# Patient Record
Sex: FEMALE | Race: WHITE | NOT HISPANIC OR LATINO | Employment: FULL TIME | URBAN - METROPOLITAN AREA
[De-identification: names, ages, dates, MRNs, and addresses within clinical notes are randomized per-mention and may not be internally consistent; named-entity substitution may affect disease eponyms.]

---

## 2017-01-09 ENCOUNTER — TRANSFERRED RECORDS (OUTPATIENT)
Dept: HEALTH INFORMATION MANAGEMENT | Facility: CLINIC | Age: 42
End: 2017-01-09

## 2017-01-10 ENCOUNTER — OFFICE VISIT (OUTPATIENT)
Dept: FAMILY MEDICINE | Facility: CLINIC | Age: 42
End: 2017-01-10
Payer: COMMERCIAL

## 2017-01-10 VITALS
DIASTOLIC BLOOD PRESSURE: 63 MMHG | SYSTOLIC BLOOD PRESSURE: 111 MMHG | HEART RATE: 60 BPM | TEMPERATURE: 98 F | OXYGEN SATURATION: 99 %

## 2017-01-10 DIAGNOSIS — R07.0 THROAT PAIN: ICD-10-CM

## 2017-01-10 DIAGNOSIS — J02.0 ACUTE STREPTOCOCCAL PHARYNGITIS: Primary | ICD-10-CM

## 2017-01-10 LAB
DEPRECATED S PYO AG THROAT QL EIA: ABNORMAL
MICRO REPORT STATUS: ABNORMAL
SPECIMEN SOURCE: ABNORMAL

## 2017-01-10 PROCEDURE — 99213 OFFICE O/P EST LOW 20 MIN: CPT | Performed by: PHYSICIAN ASSISTANT

## 2017-01-10 PROCEDURE — 87880 STREP A ASSAY W/OPTIC: CPT | Performed by: PHYSICIAN ASSISTANT

## 2017-01-10 RX ORDER — AZITHROMYCIN 250 MG/1
TABLET, FILM COATED ORAL
Qty: 6 TABLET | Refills: 0 | Status: SHIPPED | OUTPATIENT
Start: 2017-01-10 | End: 2017-07-31

## 2017-01-10 NOTE — PATIENT INSTRUCTIONS
When You Have a Sore Throat  A sore throat can be painful. There are many reasons why you may have a sore throat. Your healthcare provider will work with you to find the cause of your sore throat. He or she will also find the best treatment for you.      What Causes a Sore Throat?  Sore throats can be caused or worsened by:    Cold or flu viruses    Bacteria    Irritants such as tobacco smoke    Acid reflux  A Healthy Throat  The tonsils are on the sides of the throat near the base of the tongue. They collect viruses and bacteria and help fight infection. The throat (pharynx) is the passage for air. Mucus from the nasal cavity also moves down the passage.  An Inflamed Throat  The tonsils and pharynx can become inflamed due to a cold or flu virus. Postnasal drip (excess mucus draining from the nasal cavity) can irritate the throat. It can also make the throat or tonsils more likely to be infected by bacteria. Severe, untreated tonsillitis in children or adults can cause a pocket of pus (abscess) to form near the tonsil.  Your Evaluation  A medical evaluation can help find the cause of your sore throat. It can also help your healthcare provider choose the best treatment for you. The evaluation may include a health history, physical exam, and diagnostic tests.  Health History  Your healthcare provider may ask you the following:    How long has the sore throat lasted and how have you been treating it?    Do you have any other symptoms, such as body aches, fever, or cough?    Does your sore throat recur? If so, how often? How many days of school or work have you missed because of a sore throat?    Do you have trouble eating or swallowing?    Have you been told that you snore or have other sleep problems?    Do you have bad breath?    Do you cough up bad-tasting mucus?  Physical Exam  During the exam, your healthcare provider checks your ears, nose, and throat for problems. He or she also checks for swelling in the  neck, and may listen to your chest.  Possible Tests  Other tests your healthcare provider may perform include:    A throat swab to check for bacteria such as streptococcus (the bacteria that causes strep throat)    A blood test to check for mononucleosis (a viral infection)    A chest x-ray to rule out pneumonia, especially if you have a cough  Treating a Sore Throat  Treatment depends on many factors. What is the likely cause? Is the problem recent? Does it keep coming back? In many cases, the best thing to do is to treat the symptoms, rest, and let the problem heal itself. Antibiotics may help clear up some infections. For cases of severe or recurring tonsillitis, the tonsils may need to be removed.  Relieving Your Symptoms    Don t smoke, and avoid secondhand smoke.    For children, try throat sprays or Popsicles. Adults and older children may try lozenges.    Drink warm liquids to soothe the throat and help thin mucus. Avoid alcohol, spicy foods, and acidic drinks such as orange juice. These can irritate the throat.    Gargle with warm saltwater (1 teaspoon of salt to 8 ounces of warm water).    Use a humidifier to keep air moist and relieve throat dryness.    Try over-the-counter pain relievers such as acetaminophen or ibuprofen. Use as directed, and don t exceed the recommended dose. Don t give aspirin to children.   Are Antibiotics Needed?  If your sore throat is due to a bacterial infection, antibiotics may speed healing and prevent complications. But most sore throats are caused by cold or flu viruses. And antibiotics don t treat viral illness. In fact, using antibiotics when they re not needed may produce bacteria that are harder to kill. Your healthcare provider will prescribe antibiotics only if he or she thinks they are likely to help.  If Antibiotics Are Prescribed  Take the medication exactly as directed. Be sure to finish your prescription even if you re feeling better.  And be sure to ask your  healthcare provider or pharmacist what side effects are common and what to do about them.  Is Surgery Needed?  In some cases, tonsils need to be removed. This is often done as outpatient (same-day) surgery. Your healthcare provider may advise removing the tonsils in cases of:    Several severe bouts of tonsillitis in a year.  Severe  episodes include those that lead to missed days of school or work, or that need to be treated with antibiotics.    Tonsillitis that causes breathing problems during sleep.    Tonsillitis caused by food particles collecting in pouches in the tonsils (cryptic tonsillitis).  Call your healthcare provider if any of the following occur:    Symptoms worsen, or new symptoms develop.    Swollen tonsils make breathing difficult.    The pain is severe enough to keep you from drinking liquids.    A skin rash, hives, or wheezing develops. Any of these could signal an allergic reaction to antibiotics.    Symptoms don t improve within a week.    Symptoms don t improve within 2-3 days of starting antibiotics.     8828-9955 [employer]. [address] [place] [address], [place] [address]. All rights reserved. This information is not intended as a substitute for professional medical care. Always follow your healthcare professional's instructions.

## 2017-01-10 NOTE — PROGRESS NOTES
SUBJECTIVE:                                                    Ulises Calderon is a 41 year old female who presents to clinic today for the following health issues:    Acute Illness   Acute illness concerns: Sore throat  Onset:     Fever: no    Chills/Sweats: no    Headache (location?): no    Sinus Pressure:no    Conjunctivitis:  no    Ear Pain: no    Rhinorrhea: no    Congestion: no    Sore Throat: YES     Cough: no    Wheeze: no    Decreased Appetite: no    Nausea: no    Vomiting: no    Diarrhea:  no    Dysuria/Freq.: no    Fatigue/Achiness: YES    Sick/Strep Exposure: unknown, but had it in december     Therapies Tried and outcome: Nothing    Problem list and histories reviewed & adjusted, as indicated.  Additional history: as documented    ROS:  C: NEGATIVE for fever, chills, change in weight  R: NEGATIVE for significant cough or SOB  CV: NEGATIVE for chest pain, palpitations or peripheral edema  GI: NEGATIVE for nausea, abdominal pain, heartburn, or change in bowel habits    Patient Active Problem List   Diagnosis     Herpes simplex virus (HSV) infection     cervical cone bx      HUMAN PAPILLOMAVIRUS NOS     Anxiety state     CARDIOVASCULAR SCREENING; LDL GOAL LESS THAN 160     Endometriosis     RA (rheumatoid arthritis) (H)     Past Surgical History   Procedure Laterality Date     C nonspecific procedure       Left radius fx - s/p surgery     C iud,mirena  10/06     removed right away due to bleeding all the time     Surgical history of -   3/12/09     D&C; ablation of endometriosis     C/section, low transverse  2006      x2     D & c       Conization       abnormal pap     Esophagoscopy, gastroscopy, duodenoscopy (egd), combined  2012     Procedure:COMBINED ESOPHAGOSCOPY, GASTROSCOPY, DUODENOSCOPY (EGD); Surgeon:DEBBIE ANDREWS; Location: GI     Orthopedic surgery       left wrist       Social History   Substance Use Topics     Smoking status: Never Smoker      Smokeless  tobacco: Never Used     Alcohol Use: 1.0 oz/week      Comment: occ     Family History   Problem Relation Age of Onset     C.A.D. No family hx of      DIABETES No family hx of      Hypertension No family hx of      Breast Cancer No family hx of      Cancer - colorectal No family hx of      Respiratory Sister      ASTHMA     CANCER Maternal Grandmother      cancer of the larynx           Problem list, Medication list, Allergies, and Medical/Social/Surgical histories reviewed in [facility] and updated as appropriate.    OBJECTIVE:                                                    /63 mmHg  Pulse 60  Temp(Src) 98  F (36.7  C) (Oral)  SpO2 99% There is no weight on file to calculate BMI.   General appearance: alert/in no distress  Ears: R TM -External ear normal. Canal clear. TM normal, L TM - External ear normal. Canal clear. TM normal  Nose:clear rhinorrea, mild mucosal injection and edema  Oropharynx: With mild erythema. Without edema, or exudates. Moist mucus membranes. No lesions  Neck: normal, supple and small, benign anterior cervical nodes bilaterally  Lungs: clear to auscultation, no rhales, rhonchi or wheezing  Heart: regular rate and rhythm and no murmurs, clicks, or gallops        Results for orders placed or performed in visit on 01/10/17 (from the past 24 hour(s))   Strep, Rapid Screen   Result Value Ref Range    Specimen Description Throat     Rapid Strep A Screen (A)      POSITIVE: Group A Streptococcal antigen detected by immunoassay.    Micro Report Status FINAL 01/10/2017           ASSESSMENT/PLAN:                                                        ICD-10-CM    1. Acute streptococcal pharyngitis J02.0 azithromycin (ZITHROMAX) 250 MG tablet   2. Throat pain R07.0 Strep, Rapid Screen       Patient Instructions       When You Have a Sore Throat  A sore throat can be painful. There are many reasons why you may have a sore throat. Your healthcare provider will work with you to find the cause of your  sore throat. He or she will also find the best treatment for you.      What Causes a Sore Throat?  Sore throats can be caused or worsened by:    Cold or flu viruses    Bacteria    Irritants such as tobacco smoke    Acid reflux  A Healthy Throat  The tonsils are on the sides of the throat near the base of the tongue. They collect viruses and bacteria and help fight infection. The throat (pharynx) is the passage for air. Mucus from the nasal cavity also moves down the passage.  An Inflamed Throat  The tonsils and pharynx can become inflamed due to a cold or flu virus. Postnasal drip (excess mucus draining from the nasal cavity) can irritate the throat. It can also make the throat or tonsils more likely to be infected by bacteria. Severe, untreated tonsillitis in children or adults can cause a pocket of pus (abscess) to form near the tonsil.  Your Evaluation  A medical evaluation can help find the cause of your sore throat. It can also help your healthcare provider choose the best treatment for you. The evaluation may include a health history, physical exam, and diagnostic tests.  Health History  Your healthcare provider may ask you the following:    How long has the sore throat lasted and how have you been treating it?    Do you have any other symptoms, such as body aches, fever, or cough?    Does your sore throat recur? If so, how often? How many days of school or work have you missed because of a sore throat?    Do you have trouble eating or swallowing?    Have you been told that you snore or have other sleep problems?    Do you have bad breath?    Do you cough up bad-tasting mucus?  Physical Exam  During the exam, your healthcare provider checks your ears, nose, and throat for problems. He or she also checks for swelling in the neck, and may listen to your chest.  Possible Tests  Other tests your healthcare provider may perform include:    A throat swab to check for bacteria such as streptococcus (the bacteria that  causes strep throat)    A blood test to check for mononucleosis (a viral infection)    A chest x-ray to rule out pneumonia, especially if you have a cough  Treating a Sore Throat  Treatment depends on many factors. What is the likely cause? Is the problem recent? Does it keep coming back? In many cases, the best thing to do is to treat the symptoms, rest, and let the problem heal itself. Antibiotics may help clear up some infections. For cases of severe or recurring tonsillitis, the tonsils may need to be removed.  Relieving Your Symptoms    Don t smoke, and avoid secondhand smoke.    For children, try throat sprays or Popsicles. Adults and older children may try lozenges.    Drink warm liquids to soothe the throat and help thin mucus. Avoid alcohol, spicy foods, and acidic drinks such as orange juice. These can irritate the throat.    Gargle with warm saltwater (1 teaspoon of salt to 8 ounces of warm water).    Use a humidifier to keep air moist and relieve throat dryness.    Try over-the-counter pain relievers such as acetaminophen or ibuprofen. Use as directed, and don t exceed the recommended dose. Don t give aspirin to children.   Are Antibiotics Needed?  If your sore throat is due to a bacterial infection, antibiotics may speed healing and prevent complications. But most sore throats are caused by cold or flu viruses. And antibiotics don t treat viral illness. In fact, using antibiotics when they re not needed may produce bacteria that are harder to kill. Your healthcare provider will prescribe antibiotics only if he or she thinks they are likely to help.  If Antibiotics Are Prescribed  Take the medication exactly as directed. Be sure to finish your prescription even if you re feeling better.  And be sure to ask your healthcare provider or pharmacist what side effects are common and what to do about them.  Is Surgery Needed?  In some cases, tonsils need to be removed. This is often done as outpatient  "(same-day) surgery. Your healthcare provider may advise removing the tonsils in cases of:    Several severe bouts of tonsillitis in a year.  Severe  episodes include those that lead to missed days of school or work, or that need to be treated with antibiotics.    Tonsillitis that causes breathing problems during sleep.    Tonsillitis caused by food particles collecting in pouches in the tonsils (cryptic tonsillitis).  Call your healthcare provider if any of the following occur:    Symptoms worsen, or new symptoms develop.    Swollen tonsils make breathing difficult.    The pain is severe enough to keep you from drinking liquids.    A skin rash, hives, or wheezing develops. Any of these could signal an allergic reaction to antibiotics.    Symptoms don t improve within a week.    Symptoms don t improve within 2-3 days of starting antibiotics.     1768-4100 [employer]. [address] [place] [address]. All rights reserved. This information is not intended as a substitute for professional medical care. Always follow your healthcare professional's instructions.                Estimated body mass index is 23.25 kg/(m^2) as calculated from the following:    Height as of 10/13/16: 5' 4\" (1.626 m).    Weight as of 12/12/16: 135 lb 8 oz (61.462 kg).       Ling Coyne [facility] [[email or web address]]kim  [facility]        "

## 2017-02-23 ENCOUNTER — MYC MEDICAL ADVICE (OUTPATIENT)
Dept: FAMILY MEDICINE | Facility: CLINIC | Age: 42
End: 2017-02-23

## 2017-02-23 NOTE — TELEPHONE ENCOUNTER
Refer to [employer] message. Closing encounter as no further action is needed.    Shu Dent RN  [facility]

## 2017-04-12 ENCOUNTER — MYC REFILL (OUTPATIENT)
Dept: FAMILY MEDICINE | Facility: CLINIC | Age: 42
End: 2017-04-12

## 2017-04-12 DIAGNOSIS — F41.1 GAD (GENERALIZED ANXIETY DISORDER): ICD-10-CM

## 2017-04-12 NOTE — TELEPHONE ENCOUNTER
Routing refill request to provider for review/approval because:  Drug not on the [facility] refill protocol     Lorazepam refill request  Last refill 12/6/16,  review done    Mohan Taylor RN

## 2017-04-12 NOTE — TELEPHONE ENCOUNTER
Message from MyChart:  Original authorizing provider: MD Ulises Yuan would like a refill of the following medications:  LORazepam (ATIVAN) 1 MG tablet [Sohail Simmons MD]    Preferred pharmacy: [place], [place] - [address]    Comment:

## 2017-04-13 RX ORDER — LORAZEPAM 1 MG/1
1 TABLET ORAL EVERY 8 HOURS PRN
Qty: 30 TABLET | Refills: 0 | Status: SHIPPED | OUTPATIENT
Start: 2017-04-13 | End: 2017-08-16

## 2017-04-25 ENCOUNTER — TRANSFERRED RECORDS (OUTPATIENT)
Dept: HEALTH INFORMATION MANAGEMENT | Facility: CLINIC | Age: 42
End: 2017-04-25

## 2017-05-08 ENCOUNTER — MYC MEDICAL ADVICE (OUTPATIENT)
Dept: FAMILY MEDICINE | Facility: CLINIC | Age: 42
End: 2017-05-08

## 2017-05-08 NOTE — TELEPHONE ENCOUNTER
Called pt and pulled up Trevin's information and documented in his chart.    Darcie Bowman RN  [facility]

## 2017-07-31 ENCOUNTER — OFFICE VISIT (OUTPATIENT)
Dept: FAMILY MEDICINE | Facility: CLINIC | Age: 42
End: 2017-07-31
Payer: COMMERCIAL

## 2017-07-31 ENCOUNTER — TRANSFERRED RECORDS (OUTPATIENT)
Dept: HEALTH INFORMATION MANAGEMENT | Facility: CLINIC | Age: 42
End: 2017-07-31

## 2017-07-31 VITALS
BODY MASS INDEX: 23.6 KG/M2 | OXYGEN SATURATION: 100 % | DIASTOLIC BLOOD PRESSURE: 77 MMHG | HEART RATE: 66 BPM | SYSTOLIC BLOOD PRESSURE: 122 MMHG | WEIGHT: 137.5 LBS | TEMPERATURE: 98.3 F

## 2017-07-31 DIAGNOSIS — G44.201 ACUTE INTRACTABLE TENSION-TYPE HEADACHE: ICD-10-CM

## 2017-07-31 DIAGNOSIS — R20.2 NUMBNESS AND TINGLING OF LEFT SIDE OF FACE: Primary | ICD-10-CM

## 2017-07-31 DIAGNOSIS — R20.0 NUMBNESS AND TINGLING OF LEFT SIDE OF FACE: Primary | ICD-10-CM

## 2017-07-31 PROCEDURE — 99213 OFFICE O/P EST LOW 20 MIN: CPT | Performed by: FAMILY MEDICINE

## 2017-07-31 NOTE — PROGRESS NOTES
SUBJECTIVE:                                                    Ulises Calderon is a 42 year old female who presents to clinic today for the following health issues:    ED/UC Followup:    Facility:  [facility]  Date of visit: 7/19/17  Reason for visit: numbness in face, tongue swelling, vision issues w/ left eye   Current Status: Improved          In considering pregnancy  Did get a 2 day headache after the event  Did come after she stopped the methotrexate  Problem list and histories reviewed & adjusted, as indicated.  Additional history: as documented    Labs reviewed in EPIC    Reviewed and updated as needed this visit by clinical staff       Reviewed and updated as needed this visit by Provider         ROS:  Constitutional, HEENT, cardiovascular, pulmonary, gi and gu systems are negative, except as otherwise noted.      OBJECTIVE:   /77 (BP Location: Left arm, Patient Position: Chair, Cuff Size: Adult Regular)  Pulse 66  Temp 98.3  F (36.8  C) (Oral)  Wt 137 lb 8 oz (62.4 kg)  SpO2 100%  BMI 23.6 kg/m2  Body mass index is 23.6 kg/(m^2).  GENERAL: healthy, alert and no distress  EYES: Eyes grossly normal to inspection, PERRL and conjunctivae and sclerae normal  HENT: ear canals and TM's normal, nose and mouth without ulcers or lesions  NECK: no adenopathy, no asymmetry, masses, or scars and thyroid normal to palpation  RESP: lungs clear to auscultation - no rales, rhonchi or wheezes  CV: regular rate and rhythm, normal S1 S2, no S3 or S4, no murmur, click or rub, no peripheral edema and peripheral pulses strong  ABDOMEN: soft, nontender, no hepatosplenomegaly, no masses and bowel sounds normal  SKIN: no suspicious lesions or rashes  NEURO: Normal strength and tone, mentation intact and speech normal  PSYCH: mentation appears normal, affect normal/bright  LYMPH: no cervical, supraclavicular, axillary, or inguinal adenopathy        ASSESSMENT/PLAN:             1. Numbness and tingling of left side of  face  normal exam now, may have been a migraine   stop OCP  Reviewed BC options  Start folic acid    2. Acute intractable tension-type headache  Resolved   call if recurs      Regular exercise  See Patient Instructions    Sohail Simmons MD  [facility]

## 2017-07-31 NOTE — NURSING NOTE
"Chief Complaint   Patient presents with     RECHECK       Initial /77 (BP Location: Left arm, Patient Position: Chair, Cuff Size: Adult Regular)  Pulse 66  Temp 98.3  F (36.8  C) (Oral)  Wt 137 lb 8 oz (62.4 kg)  SpO2 100%  BMI 23.6 kg/m2 Estimated body mass index is 23.6 kg/(m^2) as calculated from the following:    Height as of 10/13/16: 5' 4\" (1.626 m).    Weight as of this encounter: 137 lb 8 oz (62.4 kg).  Medication Reconciliation: complete     Dannielle Albert CMA    "

## 2017-07-31 NOTE — MR AVS SNAPSHOT
After Visit Summary   7/31/2017    Ulises Calderon    MRN: [id number]           Patient Information     Date Of Birth          1975        Visit Information        Provider Department      7/31/2017 11:30 AM Sohail Simmons MD [facility]        Today's Diagnoses     Numbness and tingling of left side of face    -  1    Acute intractable tension-type headache           Follow-ups after your visit        Who to contact     If you have questions or need follow up information about today's clinic visit or your schedule please contact [facility] directly at [phone].  Normal or non-critical lab and imaging results will be communicated to you by [employer], letter or phone within 4 business days after the clinic has received the results. If you do not hear from us within 7 days, please contact the clinic through [employer] or phone. If you have a critical or abnormal lab result, we will notify you by phone as soon as possible.  Submit refill requests through [employer] or call your pharmacy and they will forward the refill request to us. Please allow 3 business days for your refill to be completed.          Additional Information About Your Visit        MyChart Information     [employer] gives you secure access to your electronic health record. If you see a primary care provider, you can also send messages to your care team and make appointments. If you have questions, please call your primary care clinic.  If you do not have a primary care provider, please call [phone] and they will assist you.        Care EveryWhere ID     This is your Care EveryWhere ID. This could be used by other organizations to access your [place] medical records  [id number]        Your Vitals Were     Pulse Temperature Pulse Oximetry BMI (Body Mass Index)          66 98.3  F (36.8  C) (Oral) 100% 23.6 kg/m2         Blood Pressure from Last 3 Encounters:   07/31/17 122/77   01/10/17 111/63    12/12/16 113/70    Weight from Last 3 Encounters:   07/31/17 137 lb 8 oz (62.4 kg)   12/12/16 135 lb 8 oz (61.5 kg)   10/13/16 129 lb (58.5 kg)              Today, you had the following     No orders found for display         Today's Medication Changes          These changes are accurate as of: 7/31/17 12:58 PM.  If you have any questions, ask your nurse or doctor.               Stop taking these medicines if you haven't already. Please contact your care team if you have questions.     azithromycin 250 MG tablet   Commonly known as:  ZITHROMAX           BENZONATATE PO           ciprofloxacin 250 MG tablet   Commonly known as:  CIPRO                    Primary Care Provider Office Phone # Fax #    Sohail Juan Simmons [phone]       [facility] [address] [facility] [address]  [facility] [address]        Equal Access to Services     TAVIA [facility]HELLE : Hadii samanta grossman hadasho Soyuliya, waaxda luqadaha, qaybta kaalmada adeegyada, laura blackmon haydariela travis . So [facility] [phone].    ATENCIÓN: Si habla español, tiene a almanza disposición servicios gratuitos de asistencia lingüística. Llame al [phone].    We comply with applicable federal civil rights laws and [place] laws. We do not discriminate on the basis of race, color, national origin, age, disability sex, sexual orientation or gender identity.            Thank you!     Thank you for choosing [facility]  for your care. Our goal is always to provide you with excellent care. Hearing back from our patients is one way we can continue to improve our services. Please take a few minutes to complete the written survey that you may receive in the mail after your visit with us. Thank you!             Your Updated Medication List - Protect others around you: Learn how to safely use, store and throw away your medicines at www.disposemymeds.org.          This list is accurate as of: 7/31/17 12:58 PM.  Always use your most  recent med list.                   Brand Name Dispense Instructions for use Diagnosis    FOLIC ACID PO      Take 1 mg by mouth daily        HUMIRA 20 MG/0.4ML Kit   Generic drug:  adalimumab      Inject 20 mg Subcutaneous every 14 days        LORazepam 1 MG tablet    ATIVAN    30 tablet    Take 1 tablet (1 mg) by mouth every 8 hours as needed for anxiety    SARA (generalized anxiety disorder)       METHOTREXATE PO      Take 2.5 mg by mouth        norethindrone-ethinyl estradiol 0.5-35 MG-MCG per tablet    NECON 0.5/35 (28)    84 tablet    Take 1 tablet by mouth daily    Birth control       venlafaxine 37.5 MG 24 hr capsule    EFFEXOR-XR    46 capsule    Take 1 capsule daily for 14 days, then take 2 capsules daily.    SARA (generalized anxiety disorder)

## 2017-08-01 ENCOUNTER — MYC MEDICAL ADVICE (OUTPATIENT)
Dept: FAMILY MEDICINE | Facility: CLINIC | Age: 42
End: 2017-08-01

## 2017-08-01 DIAGNOSIS — Z30.018 ENCOUNTER FOR INITIAL PRESCRIPTION OF OTHER CONTRACEPTIVES: Primary | ICD-10-CM

## 2017-08-01 RX ORDER — ACETAMINOPHEN AND CODEINE PHOSPHATE 120; 12 MG/5ML; MG/5ML
1 SOLUTION ORAL DAILY
Qty: 84 TABLET | Refills: 1 | Status: SHIPPED | OUTPATIENT
Start: 2017-08-01 | End: 2019-11-29

## 2017-08-01 NOTE — TELEPHONE ENCOUNTER
Dr. Simmons,     Patient was seen by rheumatologist yesterday. She would like to stay on an oral contraceptive until September. Pharmacy cued. Please see her [employer] message below.         Thank you,   Shu Dent RN  [facility]

## 2017-08-01 NOTE — TELEPHONE ENCOUNTER
OK to switch to   Orders Placed This Encounter     norethindrone (MICRONOR) 0.35 MG per tablet     Sig: Take 1 tablet (0.35 mg) by mouth daily     Dispense:  84 tablet     Refill:  1

## 2017-10-11 ENCOUNTER — MYC MEDICAL ADVICE (OUTPATIENT)
Dept: FAMILY MEDICINE | Facility: CLINIC | Age: 42
End: 2017-10-11

## 2017-10-11 NOTE — TELEPHONE ENCOUNTER
Spoke with pt. Asked her if she was soaking through more than 2 pads or tampons per hour for more than 2 hours and her response was yes.  States it is to the point where she is using a super tampon for 2-4 hours, then nothing, then happens again and has to wear pads or tampons together. Monday am woke up and soaked through sheets, sweat pants and mattress. No dizziness. Body feels like what it feels like with her regular period. Sunday night she had a lot of pain with intercourse. Not unusual for her to have a heavy period like this every 3 months. Goal is to be off the pill until she conceives.  Last time the heavy bleeding happened was this am. The bleeding started on day 14 of her cycle. When she starts bleeding, it lasts for 5-7 days. Recommended she go to ER and pt declined. Has been that heavy since Monday.    Her questions are:  1. Is this normal for her to bleed weekly?   2. How long before her body regulates and is this part of the process.   3. Can she conceive with all of this bleeding?     Did advise that it is normal to have breakthrough bleeding, but this amount does not seem normal. Will route to Dr. Simmons. Pt is aware that Dr. Simmons is out of office until tomorrow.     Darcie Bowman RN  [facility]

## 2017-10-11 NOTE — TELEPHONE ENCOUNTER
Attempted call to pt. Left message on answering machine for patient to call back.    Need more information about her symptoms.    Sent [employer] message to pt. Await response or call back.    Darcie oBwman RN  [facility]

## 2017-10-12 NOTE — TELEPHONE ENCOUNTER
I called patient  'she is off all med s EXCEPT folate and PNV   her bleeding stopped today   we discussed pre conception issues for her age  Follow up as needed

## 2017-10-12 NOTE — TELEPHONE ENCOUNTER
Dr. Simmons,     Patient sent a [employer] message this afternoon stating bleeding stopped this morning, no major bleeding since 6:00pm last night.     Shu Dent RN  [facility]

## 2017-10-19 RX ORDER — NORETHINDRONE AND ETHINYL ESTRADIOL 0.5-0.035
KIT ORAL
Qty: 84 TABLET | Refills: 3 | OUTPATIENT
Start: 2017-10-19

## 2017-10-19 NOTE — TELEPHONE ENCOUNTER
Spoke with patient, she reports she is not taking this medication (Neocon)    Shu Dent BSN RN  [facility]

## 2018-01-10 ENCOUNTER — MYC REFILL (OUTPATIENT)
Dept: FAMILY MEDICINE | Facility: CLINIC | Age: 43
End: 2018-01-10

## 2018-01-10 DIAGNOSIS — F41.1 GAD (GENERALIZED ANXIETY DISORDER): ICD-10-CM

## 2018-01-10 NOTE — TELEPHONE ENCOUNTER
Message from MyChart:  Original authorizing provider: PRESTON Tipton CNP would like a refill of the following medications:  LORazepam (ATIVAN) 1 MG tablet [PRESTON Tipton CNP]    Preferred pharmacy: [facility] [address]    Comment:

## 2018-01-10 NOTE — TELEPHONE ENCOUNTER
LORazepam (ATIVAN) 1 MG tablet        Last Written Prescription Date:  8/17/17  Last Fill Quantity: 10,   # refills: 0  Last Office Visit: 7/31/17  Future Office visit:       Routing refill request to provider for review/approval because:  Drug not on the [facility], P or [facility] refill protocol or controlled substance

## 2018-01-11 RX ORDER — LORAZEPAM 1 MG/1
1 TABLET ORAL EVERY 8 HOURS PRN
Qty: 10 TABLET | Refills: 0 | Status: SHIPPED | OUTPATIENT
Start: 2018-01-11 | End: 2018-06-25

## 2018-01-11 NOTE — TELEPHONE ENCOUNTER
Problem List Complete:  Yes      /Last Office Visit with [facility] primary care provider:     Clinic visit frequency required: unspecified     Future Office visit:     Controlled substance agreement on file: No.     Processing:  may be called or faxed     checked in past 6 months?  No, route to RN no chronic pain    Do you want E-visit or office visit?    Thank you,  Papito Tobias RN

## 2018-03-15 ENCOUNTER — MYC MEDICAL ADVICE (OUTPATIENT)
Dept: FAMILY MEDICINE | Facility: CLINIC | Age: 43
End: 2018-03-15

## 2018-03-15 NOTE — TELEPHONE ENCOUNTER
[employer] message sent to patient    Shu Dent BSN RN  [facility]

## 2018-03-19 ENCOUNTER — OFFICE VISIT (OUTPATIENT)
Dept: FAMILY MEDICINE | Facility: CLINIC | Age: 43
End: 2018-03-19
Payer: COMMERCIAL

## 2018-03-19 VITALS
HEART RATE: 74 BPM | OXYGEN SATURATION: 100 % | DIASTOLIC BLOOD PRESSURE: 79 MMHG | SYSTOLIC BLOOD PRESSURE: 130 MMHG | TEMPERATURE: 98 F

## 2018-03-19 DIAGNOSIS — N97.9 FEMALE INFERTILITY: Primary | ICD-10-CM

## 2018-03-19 PROCEDURE — 99214 OFFICE O/P EST MOD 30 MIN: CPT | Performed by: FAMILY MEDICINE

## 2018-03-19 NOTE — NURSING NOTE
"Chief Complaint   Patient presents with     Fertility       Initial /79 (BP Location: Left arm, Patient Position: Chair, Cuff Size: Adult Regular)  Pulse 74  Temp 98  F (36.7  C) (Oral)  SpO2 100% Estimated body mass index is 23.6 kg/(m^2) as calculated from the following:    Height as of 10/13/16: 5' 4\" (1.626 m).    Weight as of 7/31/17: 137 lb 8 oz (62.4 kg).  Medication Reconciliation: complete     Dannielle Albert CMA    "

## 2018-03-19 NOTE — PROGRESS NOTES
SUBJECTIVE:  Ulises Calderon, a 43 year old female scheduled an appointment to discuss the following issues:  Female infertility   Off BC, trying for 6 months with new partner   patient got pregnant easly twice before, feels like she is ovualting but cycles longer at times   have not fathered any children    Medical, social, surgical, and family histories reviewed.    ROS:  CONSTITUTIONAL: NEGATIVE for fever, chills  EYES: NEGATIVE for vision changes   RESP: NEGATIVE for significant cough or SOB  CV: NEGATIVE for chest pain, palpitations   GI: NEGATIVE for nausea, abdominal pain, heartburn, or change in bowel habits  : NEGATIVE for frequency, dysuria, or hematuria  MUSCULOSKELETAL: NEGATIVE for significant arthralgias or myalgia  NEURO: NEGATIVE for weakness, dizziness or paresthesias or headache    OBJECTIVE:  /79 (BP Location: Left arm, Patient Position: Chair, Cuff Size: Adult Regular)  Pulse 74  Temp 98  F (36.7  C) (Oral)  SpO2 100%  EXAM:  GENERAL APPEARANCE: healthy, alert and no distress  EYES: EOMI,  PERRL  HENT: ear canals and TM's normal and nose and mouth without ulcers or lesions  RESP: lungs clear to auscultation - no rales, rhonchi or wheezes  CV: regular rates and rhythm, normal S1 S2, no S3 or S4 and no murmur, click or rub -  ABDOMEN:  soft, nontender, no HSM or masses and bowel sounds normal  : normal cervix, adnexae, and uterus without masses or discharge and rectal exam normal without masses-guaiac negative stool    ASSESSMENT/PLAN:  (N97.9) Female infertility  (primary encounter diagnosis)  Comment: check labs 2-5 days after start of cycle   to get semen analysis  Plan: **CBC with platelets FUTURE 2mo, Follicle         stimulating hormone, Estradiol, Lutropin,         Prolactin        Follow up after tests

## 2018-03-19 NOTE — MR AVS SNAPSHOT
After Visit Summary   3/19/2018    Ulises Calderon    MRN: [id number]           Patient Information     Date Of Birth          1975        Visit Information        Provider Department      3/19/2018 3:00 PM Sohail Simmons MD [facility] [place]        Today's Diagnoses     Female infertility    -  1       Follow-ups after your visit        Future tests that were ordered for you today     Open Future Orders        Priority Expected Expires Ordered    **CBC with platelets FUTURE 2mo Routine 5/18/2018 7/17/2018 3/19/2018    Follicle stimulating hormone Routine  3/19/2019 3/19/2018    Estradiol Routine  3/19/2019 3/19/2018    Lutropin Routine  3/19/2019 3/19/2018    Prolactin Routine  3/19/2019 3/19/2018            Who to contact     If you have questions or need follow up information about today's clinic visit or your schedule please contact [facility] directly at [phone].  Normal or non-critical lab and imaging results will be communicated to you by MyChart, letter or phone within 4 business days after the clinic has received the results. If you do not hear from us within 7 days, please contact the clinic through [employer] or phone. If you have a critical or abnormal lab result, we will notify you by phone as soon as possible.  Submit refill requests through [employer] or call your pharmacy and they will forward the refill request to us. Please allow 3 business days for your refill to be completed.          Additional Information About Your Visit        MyChart Information     [employer] gives you secure access to your electronic health record. If you see a primary care provider, you can also send messages to your care team and make appointments. If you have questions, please call your primary care clinic.  If you do not have a primary care provider, please call [phone] and they will assist you.        Care EveryWhere ID     This is your Care EveryWhere ID. This  could be used by other organizations to access your [facility] medical records  [id number]        Your Vitals Were     Pulse Temperature Pulse Oximetry             74 98  F (36.7  C) (Oral) 100%          Blood Pressure from Last 3 Encounters:   03/19/18 130/79   07/31/17 122/77   01/10/17 111/63    Weight from Last 3 Encounters:   07/31/17 137 lb 8 oz (62.4 kg)   12/12/16 135 lb 8 oz (61.5 kg)   10/13/16 129 lb (58.5 kg)                 Today's Medication Changes          These changes are accurate as of 3/19/18  6:24 PM.  If you have any questions, ask your nurse or doctor.               Stop taking these medicines if you haven't already. Please contact your care team if you have questions.     venlafaxine 37.5 MG 24 hr capsule   Commonly known as:  EFFEXOR-XR   Stopped by:  Sohail Simmons MD                    Primary Care Provider Office Phone # Fax #    Sohail Simmons [phone]       [address] [address]        Equal Access to Services     [facility]: Hadii aad ku hadasho Soomaali, waaxda luqadaha, qaybta kaalmada adefroylanyada, laura travis . So [facility] [phone].    ATENCIÓN: Si habla español, tiene a almanza disposición servicios gratuitos de asistencia lingüística. [facility] [phone].    We comply with applicable federal civil rights laws and [place] laws. We do not discriminate on the basis of race, color, national origin, age, disability, sex, sexual orientation, or gender identity.            Thank you!     Thank you for choosing [facility]  for your care. Our goal is always to provide you with excellent care. Hearing back from our patients is one way we can continue to improve our services. Please take a few minutes to complete the written survey that you may receive in the mail after your visit with us. Thank you!             Your Updated Medication List - Protect others around you: Learn how to  safely use, store and throw away your medicines at www.disposemymeds.org.          This list is accurate as of 3/19/18  6:24 PM.  Always use your most recent med list.                   Brand Name Dispense Instructions for use Diagnosis    FOLIC ACID PO      Take 1 mg by mouth daily        HUMIRA 20 MG/0.4ML Kit   Generic drug:  adalimumab      Inject 20 mg Subcutaneous every 14 days        LORazepam 1 MG tablet    ATIVAN    10 tablet    Take 1 tablet (1 mg) by mouth every 8 hours as needed for anxiety    SARA (generalized anxiety disorder)       METHOTREXATE PO      Take 2.5 mg by mouth        norethindrone 0.35 MG per tablet    MICRONOR    84 tablet    Take 1 tablet (0.35 mg) by mouth daily    Encounter for initial prescription of other contraceptives

## 2018-03-22 DIAGNOSIS — N97.9 FEMALE INFERTILITY: ICD-10-CM

## 2018-03-22 LAB
ESTRADIOL SERPL-MCNC: 63 PG/ML
FSH SERPL-ACNC: 6.1 IU/L
LH SERPL-ACNC: 3.2 IU/L
PROLACTIN SERPL-MCNC: 12 UG/L (ref 3–27)

## 2018-03-22 PROCEDURE — 36415 COLL VENOUS BLD VENIPUNCTURE: CPT | Performed by: FAMILY MEDICINE

## 2018-03-22 PROCEDURE — 84146 ASSAY OF PROLACTIN: CPT | Performed by: FAMILY MEDICINE

## 2018-03-22 PROCEDURE — 82670 ASSAY OF TOTAL ESTRADIOL: CPT | Performed by: FAMILY MEDICINE

## 2018-03-22 PROCEDURE — 83001 ASSAY OF GONADOTROPIN (FSH): CPT | Performed by: FAMILY MEDICINE

## 2018-03-22 PROCEDURE — 83002 ASSAY OF GONADOTROPIN (LH): CPT | Performed by: FAMILY MEDICINE

## 2018-06-05 ENCOUNTER — OFFICE VISIT (OUTPATIENT)
Dept: FAMILY MEDICINE | Facility: CLINIC | Age: 43
End: 2018-06-05
Payer: COMMERCIAL

## 2018-06-05 VITALS
DIASTOLIC BLOOD PRESSURE: 84 MMHG | OXYGEN SATURATION: 100 % | SYSTOLIC BLOOD PRESSURE: 128 MMHG | TEMPERATURE: 98.2 F | HEART RATE: 76 BPM

## 2018-06-05 DIAGNOSIS — M06.9 RHEUMATOID ARTHRITIS INVOLVING MULTIPLE SITES, UNSPECIFIED RHEUMATOID FACTOR PRESENCE: ICD-10-CM

## 2018-06-05 DIAGNOSIS — S83.8X2A ACUTE MEDIAL MENISCAL INJURY OF LEFT KNEE, INITIAL ENCOUNTER: Primary | ICD-10-CM

## 2018-06-05 PROCEDURE — 99214 OFFICE O/P EST MOD 30 MIN: CPT | Performed by: FAMILY MEDICINE

## 2018-06-05 NOTE — PROGRESS NOTES
SUBJECTIVE:   Ulises Calderon is a 43 year old female who presents to clinic today for the following health issues:    Joint Pain    Onset: 2 months     Description:   Location: left knee  Character: feel like a frozen muscle in knee with side side motion. Has a huge popping sound.    Intensity: when sitting or resting will have no pain, but side to side movement will have excruciating pain.     Progression of Symptoms: worsening over the last month    Accompanying Signs & Symptoms:  Other symptoms: swelling and with fluids     History:   Previous similar pain: no       Precipitating factors:   Trauma or overuse: No, but very active and might be from setting knee on bed while picking up a basket and twisted knee.     Alleviating factors:  Improved by: rest/inactivity and ice    Therapies Tried and outcome: ibuprofen and saw a chiropractic a few weeks ago but has no relief at all.         Encounter Diagnoses   Name Primary?     Acute medial meniscal injury of left knee, initial encounter Yes     Rheumatoid arthritis involving multiple sites, unspecified rheumatoid factor presence (H) pain worse with humidity   trying to stay off Humira as she tries to get pregnant       Has been trying to get prepnant times 8 months, had normal labs  Problem list and histories reviewed & adjusted, as indicated.  Additional history: as documented    Labs reviewed in EPIC    Reviewed and updated as needed this visit by clinical staff       Reviewed and updated as needed this visit by Provider         ROS:  Constitutional, HEENT, cardiovascular, pulmonary, gi and gu systems are negative, except as otherwise noted.    OBJECTIVE:     /84  Pulse 76  Temp 98.2  F (36.8  C) (Oral)  SpO2 100%  There is no height or weight on file to calculate BMI.  GENERAL: healthy and alert  NECK: no adenopathy, no asymmetry, masses, or scars and thyroid normal to palpation  RESP: lungs clear to auscultation - no rales, rhonchi or wheezes  CV:  regular rate and rhythm, normal S1 S2, no S3 or S4, no murmur, click or rub, no peripheral edema and peripheral pulses strong  ABDOMEN: soft, nontender, no hepatosplenomegaly, no masses and bowel sounds normal  MS: normal muscle tone and   Knee exam: antalgic gait, soft tissue tenderness over medial joint line, effusion, positive pivot-shift, collateral ligaments intact, positive Gloria sign.  X-ray: ordered, but results not yet available.            ASSESSMENT/PLAN:             1. Acute medial meniscal injury of left knee, initial encounter  Ice, rest  - MR Knee Left w Contrast; Future  - ORTHOPEDICS ADULT REFERRAL    2. Rheumatoid arthritis involving multiple sites, unspecified rheumatoid factor presence (H)  Ok to try medrol, Humira is Cat B so is an optin as she tries to get pregnant    3. Will see OB for fertility workup         See orders in EpicCare    See Patient Instructions    Sohail Simmons MD  [facility]

## 2018-06-05 NOTE — MR AVS SNAPSHOT
After Visit Summary   6/5/2018    Uliess Calderon    MRN: [id number]           Patient Information     Date Of Birth          1975        Visit Information        Provider Department      6/5/2018 8:30 AM Sohail Simmons [place] [facility]        Today's Diagnoses     Acute medial meniscal injury of left knee, initial encounter    -  1       Follow-ups after your visit        Additional Services     ORTHOPEDICS ADULT REFERRAL       Your provider has referred you to: [facility] [place] [phone]   [email or web address]    Please be aware that coverage of these services is subject to the terms and limitations of your health insurance plan.  Call member services at your health plan with any benefit or coverage questions.      Please bring the following to your appointment:    >>   Any x-rays, CTs or MRIs which have been performed.  Contact the facility where they were done to arrange for  prior to your scheduled appointment.  Any new CT, MRI or other procedures ordered by your specialist must be performed at a [facility] facility or coordinated by your clinic's referral office.    >>   List of current medications   >>   This referral request   >>   Any documents/labs given to you for this referral                  Future tests that were ordered for you today     Open Future Orders        Priority Expected Expires Ordered    MR Knee Left w Contrast Routine  6/5/2019 6/5/2018            Who to contact     If you have questions or need follow up information about today's clinic visit or your schedule please contact [facility] directly at [phone].  Normal or non-critical lab and imaging results will be communicated to you by MyChart, letter or phone within 4 business days after the clinic has received the results. If you do not hear from us within 7 days, please contact the clinic through MyChart or phone. If you have a  critical or abnormal lab result, we will notify you by phone as soon as possible.  Submit refill requests through [employer] or call your pharmacy and they will forward the refill request to us. Please allow 3 business days for your refill to be completed.          Additional Information About Your Visit        "[employer]hart Information     [employer] gives you secure access to your electronic health record. If you see a primary care provider, you can also send messages to your care team and make appointments. If you have questions, please call your primary care clinic.  If you do not have a primary care provider, please call [phone] and they will assist you.        Care EveryWhere ID     This is your Care EveryWhere ID. This could be used by other organizations to access your [facility] medical records  [id number]        Your Vitals Were     Pulse Temperature Pulse Oximetry             76 98.2  F (36.8  C) (Oral) 100%          Blood Pressure from Last 3 Encounters:   06/05/18 128/84   03/19/18 130/79   07/31/17 122/77    Weight from Last 3 Encounters:   07/31/17 137 lb 8 oz (62.4 kg)   12/12/16 135 lb 8 oz (61.5 kg)   10/13/16 129 lb (58.5 kg)              We Performed the Following     ORTHOPEDICS ADULT REFERRAL        Primary Care Provider Office Phone # Fax #    Sohail Juan Simmons [phone]       [address] [facility] [address]  [facility] [address]        Equal Access to Services     [facility] : Hadii aad ku hadasho Soomaali, waaxda luqadaha, qaybta kaalmada adeegyada, waxay neymar travis . So [facility] [phone].    ATENCIÓN: Si habla español, tiene a almanza disposición servicios gratuitos de asistencia lingüística. Michelle chavez [phone].    We comply with applicable federal civil rights laws and [place] laws. We do not discriminate on the basis of race, color, national origin, age, disability, sex, sexual orientation, or gender identity.            Thank you!     Thank you for  choosing [facility]  for your care. Our goal is always to provide you with excellent care. Hearing back from our patients is one way we can continue to improve our services. Please take a few minutes to complete the written survey that you may receive in the mail after your visit with us. Thank you!             Your Updated Medication List - Protect others around you: Learn how to safely use, store and throw away your medicines at www.disposemymeds.org.          This list is accurate as of 6/5/18  8:48 AM.  Always use your most recent med list.                   Brand Name Dispense Instructions for use Diagnosis    FOLIC ACID PO      Take 1 mg by mouth daily        HUMIRA 20 MG/0.4ML Kit   Generic drug:  adalimumab      Inject 20 mg Subcutaneous every 14 days        LORazepam 1 MG tablet    ATIVAN    10 tablet    Take 1 tablet (1 mg) by mouth every 8 hours as needed for anxiety    SARA (generalized anxiety disorder)       METHOTREXATE PO      Take 2.5 mg by mouth        norethindrone 0.35 MG per tablet    MICRONOR    84 tablet    Take 1 tablet (0.35 mg) by mouth daily    Encounter for initial prescription of other contraceptives

## 2018-06-11 ENCOUNTER — HOSPITAL ENCOUNTER (OUTPATIENT)
Dept: MRI IMAGING | Facility: CLINIC | Age: 43
Discharge: HOME | End: 2018-06-11
Attending: FAMILY MEDICINE | Admitting: FAMILY MEDICINE
Payer: COMMERCIAL

## 2018-06-11 DIAGNOSIS — S83.8X2A ACUTE MEDIAL MENISCAL INJURY OF LEFT KNEE, INITIAL ENCOUNTER: ICD-10-CM

## 2018-06-11 PROCEDURE — 73721 MRI JNT OF LWR EXTRE W/O DYE: CPT | Mod: LT

## 2018-06-25 ENCOUNTER — OFFICE VISIT (OUTPATIENT)
Dept: OBGYN | Facility: CLINIC | Age: 43
End: 2018-06-25
Payer: COMMERCIAL

## 2018-06-25 ENCOUNTER — OFFICE VISIT (OUTPATIENT)
Dept: FAMILY MEDICINE | Facility: CLINIC | Age: 43
End: 2018-06-25
Payer: COMMERCIAL

## 2018-06-25 VITALS
WEIGHT: 140.3 LBS | OXYGEN SATURATION: 99 % | SYSTOLIC BLOOD PRESSURE: 122 MMHG | HEART RATE: 69 BPM | TEMPERATURE: 98.1 F | BODY MASS INDEX: 23.95 KG/M2 | HEIGHT: 64 IN | DIASTOLIC BLOOD PRESSURE: 76 MMHG

## 2018-06-25 VITALS
TEMPERATURE: 98.1 F | DIASTOLIC BLOOD PRESSURE: 76 MMHG | HEART RATE: 69 BPM | WEIGHT: 140 LBS | BODY MASS INDEX: 23.9 KG/M2 | HEIGHT: 64 IN | SYSTOLIC BLOOD PRESSURE: 122 MMHG

## 2018-06-25 DIAGNOSIS — Z01.818 PREOP GENERAL PHYSICAL EXAM: Primary | ICD-10-CM

## 2018-06-25 DIAGNOSIS — Z23 NEED FOR TDAP VACCINATION: ICD-10-CM

## 2018-06-25 DIAGNOSIS — Z31.49 PROCREATION MANAGEMENT INVESTIGATION AND TESTING: Primary | ICD-10-CM

## 2018-06-25 DIAGNOSIS — M06.9 RHEUMATOID ARTHRITIS INVOLVING MULTIPLE SITES, UNSPECIFIED RHEUMATOID FACTOR PRESENCE: ICD-10-CM

## 2018-06-25 DIAGNOSIS — F41.1 GAD (GENERALIZED ANXIETY DISORDER): ICD-10-CM

## 2018-06-25 DIAGNOSIS — S83.207D ACUTE MENISCAL TEAR OF LEFT KNEE, SUBSEQUENT ENCOUNTER: ICD-10-CM

## 2018-06-25 LAB
ERYTHROCYTE [DISTWIDTH] IN BLOOD BY AUTOMATED COUNT: 13.1 % (ref 10–15)
HCT VFR BLD AUTO: 44 % (ref 35–47)
HGB BLD-MCNC: 14.6 G/DL (ref 11.7–15.7)
MCH RBC QN AUTO: 31.3 PG (ref 26.5–33)
MCHC RBC AUTO-ENTMCNC: 33.2 G/DL (ref 31.5–36.5)
MCV RBC AUTO: 94 FL (ref 78–100)
PLATELET # BLD AUTO: 266 10E9/L (ref 150–450)
RBC # BLD AUTO: 4.67 10E12/L (ref 3.8–5.2)
WBC # BLD AUTO: 8.4 10E9/L (ref 4–11)

## 2018-06-25 PROCEDURE — 93000 ELECTROCARDIOGRAM COMPLETE: CPT | Performed by: FAMILY MEDICINE

## 2018-06-25 PROCEDURE — 84443 ASSAY THYROID STIM HORMONE: CPT | Performed by: OBSTETRICS & GYNECOLOGY

## 2018-06-25 PROCEDURE — 85027 COMPLETE CBC AUTOMATED: CPT | Performed by: FAMILY MEDICINE

## 2018-06-25 PROCEDURE — 99214 OFFICE O/P EST MOD 30 MIN: CPT | Mod: 25 | Performed by: FAMILY MEDICINE

## 2018-06-25 PROCEDURE — 99207 ZZC RSCC CODE FOR CODING REVIEW: CPT | Performed by: FAMILY MEDICINE

## 2018-06-25 PROCEDURE — 99000 SPECIMEN HANDLING OFFICE-LAB: CPT | Performed by: OBSTETRICS & GYNECOLOGY

## 2018-06-25 PROCEDURE — 36415 COLL VENOUS BLD VENIPUNCTURE: CPT | Performed by: FAMILY MEDICINE

## 2018-06-25 PROCEDURE — 90715 TDAP VACCINE 7 YRS/> IM: CPT | Performed by: FAMILY MEDICINE

## 2018-06-25 PROCEDURE — 83520 IMMUNOASSAY QUANT NOS NONAB: CPT | Mod: 90 | Performed by: OBSTETRICS & GYNECOLOGY

## 2018-06-25 PROCEDURE — 99242 OFF/OP CONSLTJ NEW/EST SF 20: CPT | Performed by: OBSTETRICS & GYNECOLOGY

## 2018-06-25 PROCEDURE — 90471 IMMUNIZATION ADMIN: CPT | Performed by: FAMILY MEDICINE

## 2018-06-25 RX ORDER — LORAZEPAM 1 MG/1
1 TABLET ORAL EVERY 8 HOURS PRN
Qty: 10 TABLET | Refills: 0 | Status: SHIPPED | OUTPATIENT
Start: 2018-06-25 | End: 2019-02-05

## 2018-06-25 NOTE — MR AVS SNAPSHOT
After Visit Summary   6/25/2018    Ulises Butler    MRN: [id number]           Patient Information     Date Of Birth          1975        Visit Information        Provider Department      6/25/2018 3:00 PM Sohail Simmons MD [facility]        Today's Diagnoses     Preop general physical exam    -  1    Acute meniscal tear of left knee, subsequent encounter        Need for Tdap vaccination        SARA (generalized anxiety disorder)        Rheumatoid arthritis involving multiple sites, unspecified rheumatoid factor presence (H)          Care Instructions      Before Your Surgery      Call your surgeon if there is any change in your health. This includes signs of a cold or flu (such as a sore throat, runny nose, cough, rash or fever).    Do not smoke, drink alcohol or take over the counter medicine (unless your surgeon or primary care doctor tells you to) for the 24 hours before and after surgery.    If you take prescribed drugs: Follow your doctor s orders about which medicines to take and which to stop until after surgery.    Eating and drinking prior to surgery: follow the instructions from your surgeon    Take a shower or bath the night before surgery. Use the soap your surgeon gave you to gently clean your skin. If you do not have soap from your surgeon, use your regular soap. Do not shave or scrub the surgery site.  Wear clean pajamas and have clean sheets on your bed.           Follow-ups after your visit        Who to contact     If you have questions or need follow up information about today's clinic visit or your schedule please contact [facility] directly at [phone].  Normal or non-critical lab and imaging results will be communicated to you by MyChart, letter or phone within 4 business days after the clinic has received the results. If you do not hear from us within 7 days, please contact the clinic through MyChart or phone. If you have a  "critical or abnormal lab result, we will notify you by phone as soon as possible.  Submit refill requests through [employer]-Time or call your pharmacy and they will forward the refill request to us. Please allow 3 business days for your refill to be completed.          Additional Information About Your Visit        [employer] Information     [employer]-Time gives you secure access to your electronic health record. If you see a primary care provider, you can also send messages to your care team and make appointments. If you have questions, please call your primary care clinic.  If you do not have a primary care provider, please call [phone] and they will assist you.        Care EveryWhere ID     This is your Care EveryWhere ID. This could be used by other organizations to access your [place] medical records  [id number]        Your Vitals Were     Pulse Temperature Height Pulse Oximetry BMI (Body Mass Index)       69 98.1  F (36.7  C) (Oral) 5' 4\" (1.626 m) 99% 24.08 kg/m2        Blood Pressure from Last 3 Encounters:   06/25/18 122/76   06/25/18 122/76   06/05/18 128/84    Weight from Last 3 Encounters:   06/25/18 140 lb (63.5 kg)   06/25/18 140 lb 4.8 oz (63.6 kg)   07/31/17 137 lb 8 oz (62.4 kg)              We Performed the Following     CBC with platelets     EKG 12-lead complete w/read - Clinics     TDAP VACCINE (BOOSTRIX)          Where to get your medicines      Some of these will need a paper prescription and others can be bought over the counter.  Ask your nurse if you have questions.     Bring a paper prescription for each of these medications     LORazepam 1 MG tablet          Primary Care Provider Office Phone # Fax #    Sohail Juan Simmons [phone]       [address] [facility] [address]  [facility] [address]        Equal Access to Services     ALEXUS VICKERS AH: Angeline Manzano, waaxda luqadaha, qaybta kaalmamary gutiérrez, laura neal. So wa " [phone].    ATENCIÓN: Si ashley rico, tiene a almanza disposición servicios gratuitos de asistencia lingüística. Michelle chavez [phone].    We comply with applicable federal civil rights laws and [place] laws. We do not discriminate on the basis of race, color, national origin, age, disability, sex, sexual orientation, or gender identity.            Thank you!     Thank you for choosing [facility]  for your care. Our goal is always to provide you with excellent care. Hearing back from our patients is one way we can continue to improve our services. Please take a few minutes to complete the written survey that you may receive in the mail after your visit with us. Thank you!             Your Updated Medication List - Protect others around you: Learn how to safely use, store and throw away your medicines at www.disposemymeds.org.          This list is accurate as of 6/25/18  3:55 PM.  Always use your most recent med list.                   Brand Name Dispense Instructions for use Diagnosis    FOLIC ACID PO      Take 1 mg by mouth daily        HUMIRA 20 MG/0.4ML Kit   Generic drug:  adalimumab      Inject 20 mg Subcutaneous every 14 days        LORazepam 1 MG tablet    ATIVAN    10 tablet    Take 1 tablet (1 mg) by mouth every 8 hours as needed for anxiety    SARA (generalized anxiety disorder)       METHOTREXATE PO      Take 2.5 mg by mouth        norethindrone 0.35 MG per tablet    MICRONOR    84 tablet    Take 1 tablet (0.35 mg) by mouth daily    Encounter for initial prescription of other contraceptives
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
## 2018-06-25 NOTE — PATIENT INSTRUCTIONS
"Hysterosalpingogram - x ray test to confirm inside of uterus is normal and tubes are open    Instructions for Clomid  1. Have a period  - Take a pregnancy test  - Your first of bleeding (real bleeding, not just spotting) is \"Day One\"    2. Ovulate  - Take clomid on days 3-7 (day 1 is first day of bleeding, not spotting)    3. [place]  - Start day 11  - EVERY OTHER DAY to ensure a high concentration of sperm    4. Check for ovulation  - Ovulation predictor kits helps us know when in cycle you ovulate  - Come in to the lab for a blood draw on day 21 (progesterone). If elevated, this confirms ovulation.  - Let me know when day 21 will be, so we can plan the lab draw if it falls on a weekend    5. Check for pregnancy  - If you ovulated, you'll either get a period, or you'll be pregnant.  - If you don't get a period, take a pregnancy test two weeks after your progesterone level (day 35)    Send me a Comeks message or call with any questions.    Alternative medication: letrozole         Here is the information for the fertility clinics we discussed:     [facility]   [email or web address]     [place] location:  [facility]  [address], [address]  [place] [id number] [phone]     [place] location:  [facility]  [address], [address]  [place], [place] [address] [phone]  __________________________________________________    [facility] [place]  [address]  [phone]  __________________________________________________    [facility]  [email or web address]    [place] Location:  [address], [address]  [place] [address]     [place] Location:  [address], [address]  [place] [address]  [phone]           "
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
## 2018-06-26 LAB — TSH SERPL DL<=0.005 MIU/L-ACNC: 1.55 MU/L (ref 0.4–4)

## 2018-06-28 LAB — MIS SERPL-MCNC: 1.17 NG/ML

## 2018-07-19 ENCOUNTER — TRANSFERRED RECORDS (OUTPATIENT)
Dept: HEALTH INFORMATION MANAGEMENT | Facility: CLINIC | Age: 43
End: 2018-07-19

## 2018-08-04 ENCOUNTER — HEALTH MAINTENANCE LETTER (OUTPATIENT)
Age: 43
End: 2018-08-04

## 2018-08-06 DIAGNOSIS — Z31.49 INVESTIGATION AND TESTING FOR PROCREATION MANAGEMENT: ICD-10-CM

## 2018-08-06 DIAGNOSIS — Z01.812 PRE-PROCEDURE LAB EXAM: Primary | ICD-10-CM

## 2018-08-13 ENCOUNTER — HOSPITAL ENCOUNTER (OUTPATIENT)
Dept: GENERAL RADIOLOGY | Facility: CLINIC | Age: 43
Discharge: HOME | End: 2018-08-13
Attending: OBSTETRICS & GYNECOLOGY | Admitting: OBSTETRICS & GYNECOLOGY
Payer: COMMERCIAL

## 2018-08-13 DIAGNOSIS — Z01.812 PRE-PROCEDURE LAB EXAM: ICD-10-CM

## 2018-08-13 DIAGNOSIS — Z31.49 PROCREATION MANAGEMENT INVESTIGATION AND TESTING: ICD-10-CM

## 2018-08-13 DIAGNOSIS — Z31.49 INVESTIGATION AND TESTING FOR PROCREATION MANAGEMENT: ICD-10-CM

## 2018-08-13 LAB — BETA HCG QUAL IFA URINE: NEGATIVE

## 2018-08-13 PROCEDURE — 25500064 ZZH RX 255 OP 636: Performed by: OBSTETRICS & GYNECOLOGY

## 2018-08-13 PROCEDURE — 58340 CATHETER FOR HYSTEROGRAPHY: CPT | Performed by: OBSTETRICS & GYNECOLOGY

## 2018-08-13 PROCEDURE — 84703 CHORIONIC GONADOTROPIN ASSAY: CPT | Performed by: OBSTETRICS & GYNECOLOGY

## 2018-08-13 PROCEDURE — 74740 X-RAY FEMALE GENITAL TRACT: CPT

## 2018-08-13 RX ORDER — IOPAMIDOL 510 MG/ML
50 INJECTION, SOLUTION INTRAVASCULAR ONCE
Status: COMPLETED | OUTPATIENT
Start: 2018-08-13 | End: 2018-08-13

## 2018-08-13 RX ADMIN — IOPAMIDOL 10 ML: 510 INJECTION, SOLUTION INTRAVASCULAR at 10:54

## 2018-08-14 NOTE — PROCEDURES
The patient is seen today for a hysterosalpingogram to discern tubal patency.     Procedure:  After verbal informed consent was obtained, they patient was placed in dorsal lithotomy on the fluoroscopy table. A speculum was placed in the vagina and the vagina and cervix were prepped with chlorhexidine. A tenaculum was placed on the anterior lip of the cervix.    A TeraFirrma acorn cannula was placed into the cervical os and the speculum was removed.    Under fluoroscopy, the Isovue dye was injected.    The radiologic findings will be documented by the interpreting radiologist.    The cannula and tenaculum were removed.    The patient tolerated the procedure well and was discharged to home.

## 2018-09-07 ENCOUNTER — OFFICE VISIT (OUTPATIENT)
Dept: OBGYN | Facility: CLINIC | Age: 43
End: 2018-09-07
Payer: COMMERCIAL

## 2018-09-07 VITALS — TEMPERATURE: 98.4 F | SYSTOLIC BLOOD PRESSURE: 105 MMHG | DIASTOLIC BLOOD PRESSURE: 69 MMHG | HEART RATE: 78 BPM

## 2018-09-07 DIAGNOSIS — Z31.9 ENCOUNTER FOR PROCREATIVE MANAGEMENT: Primary | ICD-10-CM

## 2018-09-07 PROCEDURE — 99213 OFFICE O/P EST LOW 20 MIN: CPT | Performed by: OBSTETRICS & GYNECOLOGY

## 2018-09-07 RX ORDER — CLOMIPHENE CITRATE 50 MG/1
TABLET ORAL
Qty: 5 TABLET | Refills: 0 | Status: SHIPPED | OUTPATIENT
Start: 2018-09-07 | End: 2018-09-25

## 2018-09-07 NOTE — PATIENT INSTRUCTIONS
"Zafar Montgomery,   Here is the information for the fertility clinics we discussed:     [facility]   [email or web address]     [place] location:  [facility]  [address], [address]  [place] [address]  [phone]     [place] location:  [facility]  [address], [address]  [place] [id number] [phone]  __________________________________________________    [facility] - [place]  [address] [place]  [phone]  __________________________________________________    [facility]  [email or web address].[employer]    [place] Location:  [address], [address]  [place] [address]     [place] Location:  [address], [address]  [place] [address]  [phone]       Instructions for Clomid    1. Have a period  - Your first of bleeding (real bleeding, not just spotting) is \"Day One\"    2. Ovulate  - Take clomid on days 5-9 (day 1 is first day of bleeding, not spotting)    3. Ingram  - Start day 11  - EVERY OTHER DAY to ensure a high concentration of sperm    4. Check for ovulation  - Ovulation predictor kits helps us know when in cycle you ovulate  - Come in to the lab for a blood draw on day 21 (progesterone). If elevated, this confirms ovulation.  - Let me know when day 21 will be, so we can plan the lab draw if it falls on a weekend    5. Check for pregnancy  - If you ovulated, you'll either get a period, or you'll be pregnant.  - If you don't get a period, take a pregnancy test two weeks after your progesterone level (day 35)    Send me a infoBizz message or call with any questions.    "

## 2018-09-07 NOTE — MR AVS SNAPSHOT
"              After Visit Summary   9/7/2018    Ulises Butler    MRN: [id number]           Patient Information     Date Of Birth          1975        Visit Information        Provider Department      9/7/2018 8:15 AM Jackie Christiansen MD [facility]        Today's Diagnoses     Encounter for procreative management    -  1      Care Instructions    Zafar Montgomery,   Here is the information for the fertility clinics we discussed:     [facility]   [email or web address]     [place] location:  [facility]  [address], [address]  [place] [address]  [phone]     [place] location:  [facility]  [address]  [place], [place] [id number] [phone]  __________________________________________________    [facility] [place] [address] [place]  [phone]  __________________________________________________    [facility]  Www.[employer]    [place] Location:  [address], [address]  [place] [address]     [place] Location:  [address] [place] [address], [address]  [place], [place] [address] [phone]       Instructions for Clomid    1. Have a period  - Your first of bleeding (real bleeding, not just spotting) is \"Day One\"    2. Ovulate  - Take clomid on days 5-9 (day 1 is first day of bleeding, not spotting)    3. [place]  - Start day 11  - EVERY OTHER DAY to ensure a high concentration of sperm    4. Check for ovulation  - Ovulation predictor kits helps us know when in cycle you ovulate  - Come in to the lab for a blood draw on day 21 (progesterone). If elevated, this confirms ovulation.  - Let me know when day 21 will be, so we can plan the lab draw if it falls on a weekend    5. Check for pregnancy  - If you ovulated, you'll either get a period, or you'll be pregnant.  - If you don't get a period, take a " pregnancy test two weeks after your progesterone level (day 35)    Send me a [employer] message or call with any questions.            Follow-ups after your visit        Future tests that were ordered for you today     Open Future Orders        Priority Expected Expires Ordered    Progesterone Routine  9/7/2019 9/7/2018            Who to contact     If you have questions or need follow up information about today's clinic visit or your schedule please contact [facility] directly at [phone].  Normal or non-critical lab and imaging results will be communicated to you by Exajoulehart, letter or phone within 4 business days after the clinic has received the results. If you do not hear from us within 7 days, please contact the clinic through [employer] or phone. If you have a critical or abnormal lab result, we will notify you by phone as soon as possible.  Submit refill requests through [employer] or call your pharmacy and they will forward the refill request to us. Please allow 3 business days for your refill to be completed.          Additional Information About Your Visit        [employer] Information     [employer] gives you secure access to your electronic health record. If you see a primary care provider, you can also send messages to your care team and make appointments. If you have questions, please call your primary care clinic.  If you do not have a primary care provider, please call [phone] and they will assist you.        Care EveryWhere ID     This is your Care EveryWhere ID. This could be used by other organizations to access your [facility] medical records  [id number]        Your Vitals Were     Pulse Temperature                78 98.4  F (36.9  C) (Oral)           Blood Pressure from Last 3 Encounters:   09/07/18 105/69   06/25/18 122/76   06/25/18 122/76    Weight from Last 3 Encounters:   06/25/18 140 lb (63.5 kg)   06/25/18 140 lb 4.8 oz (63.6 kg)   07/31/17 137 lb 8 oz (62.4 kg)                  Today's Medication Changes          These changes are accurate as of 9/7/18  8:18 AM.  If you have any questions, ask your nurse or doctor.               Start taking these medicines.        Dose/Directions    clomiPHENE 50 MG tablet   Commonly known as:  CLOMID   Used for:  Encounter for procreative management   Started by:  Jackie Christiansen MD        Days 3-7 of cycle   Quantity:  5 tablet   Refills:  0            Where to get your medicines      These medications were sent to [facility] [place], [place] - [address]  [address], [facility] [address]     Phone:  [phone]     clomiPHENE 50 MG tablet                Primary Care Provider Office Phone # Fax #    Sohail Juan Simmons [phone]       [address]  [facility] [address]        Equal Access to Services     [facility] Hospital[facility]HELLE : Hadii aad ku hadasho Soomaali, waaxda luqadaha, qaybta kaalmada adeegyada, laura wigginsin hayaameredith travis . So [facility] [phone].    ATENCIÓN: Si habla español, tiene a almanza disposición servicios gratuitos de asistencia lingüística. Llame al [phone].    We comply with applicable federal civil rights laws and [place] laws. We do not discriminate on the basis of race, color, national origin, age, disability, sex, sexual orientation, or gender identity.            Thank you!     Thank you for choosing [facility]  for your care. Our goal is always to provide you with excellent care. Hearing back from our patients is one way we can continue to improve our services. Please take a few minutes to complete the written survey that you may receive in the mail after your visit with us. Thank you!             Your Updated Medication List - Protect others around you: Learn how to safely use, store and throw away your medicines at www.disposemymeds.org.          This list is accurate as of 9/7/18  8:18 AM.  Always use your most recent med  list.                   Brand Name Dispense Instructions for use Diagnosis    clomiPHENE 50 MG tablet    CLOMID    5 tablet    Days 3-7 of cycle    Encounter for procreative management       FOLIC ACID PO      Take 1 mg by mouth daily        HUMIRA 20 MG/0.4ML Kit   Generic drug:  adalimumab      Inject 20 mg Subcutaneous every 14 days        LORazepam 1 MG tablet    ATIVAN    10 tablet    Take 1 tablet (1 mg) by mouth every 8 hours as needed for anxiety    SARA (generalized anxiety disorder)       METHOTREXATE PO      Take 2.5 mg by mouth        norethindrone 0.35 MG per tablet    MICRONOR    84 tablet    Take 1 tablet (0.35 mg) by mouth daily    Encounter for initial prescription of other contraceptives

## 2018-09-07 NOTE — NURSING NOTE
"Chief Complaint   Patient presents with     Fertility     follow up       Initial /69  Pulse 78  Temp 98.4  F (36.9  C) (Oral) Estimated body mass index is 24.03 kg/(m^2) as calculated from the following:    Height as of 18: 5' 4\" (1.626 m).    Weight as of 18: 140 lb (63.5 kg).  BP completed using cuff size: regular        The following HM Due: NONE      The following patient reported/Care Every where data was sent to:  P ABSTRACT QUALITY INITIATIVES [09480]  na      n/a              "

## 2018-09-07 NOTE — PROGRESS NOTES
GYN CLINIC VISIT  18  CC: fertility follow up    HPI:  Ulises Butler is a 43 year old  who desires pregnancy.   She was initially seen for evaluation of fertility by me on 17. She and her  have been trying to conceive for 1 year (since 2017) without success.     She has a history of rheumatoid arthritis, previously controlled on methotrexate and humira, not currently on medication and symptoms are well controlled. She also has a history of biopsy proven endometriosis, diagnosed in .     Hormone evaluation includes the following:  TSH 1.55 (18)  AMH 1.17 (18)  FSH 6.1 (3/22/18)  Estradiol 63 (3/22/18)  PRL 12 (3/22/18)    Hysterosalpingogram on 18 was normal, demonstrating patent fallopian tubes bilaterally    Vitals:    18 0807   BP: 105/69   Pulse: 78   Temp: 98.4  F (36.9  C)   TempSrc: Oral     Gen: alert, oriented, no distress      ASSESSMENT:  43 year old  who desires pregnancy. New partner, no pregnancy after 1 year of trying.  Normal testing  H/o endometriosis and rheumatoid arthritis      PLAN:  1. Encounter for procreative management  - clomiPHENE (CLOMID) 50 MG tablet; Days 3-7 of cycle  Dispense: 5 tablet; Refill: 0  - Progesterone; Future    Recommend patient see CHET due to her age. She wants to try clomid first. She is day 5 of her cycle today. Will rx clomid, 50mg to take days 5-9.    Clomid use was discussed including how to take it days 5-9 of this cycle. Discussed risks including specifically possibility of twin pregnancy, ovarian hyperstimulation, bloating, and hot flashes.  The patient's questions were answered. Discussed option of a follicle ultrasound check for about day 12 of cycle.  Patient was also instructed on how to check ovulation predictor kits during this time and how to time coital coverage for maximal pregnancy chances. Plan to check progesterone on day 21 to confirm ovulation. If no pregnancy after 3 cycles of clomid,  strongly recommend seeing CHET at that time.     Over 50% of this 15 min appt was spent in discussion about ovulation induction.     Jackie Christiansen MD

## 2018-09-25 ENCOUNTER — TRANSFERRED RECORDS (OUTPATIENT)
Dept: HEALTH INFORMATION MANAGEMENT | Facility: CLINIC | Age: 43
End: 2018-09-25

## 2018-11-13 ENCOUNTER — TRANSFERRED RECORDS (OUTPATIENT)
Dept: HEALTH INFORMATION MANAGEMENT | Facility: CLINIC | Age: 43
End: 2018-11-13

## 2019-01-31 ENCOUNTER — MYC REFILL (OUTPATIENT)
Dept: FAMILY MEDICINE | Facility: CLINIC | Age: 44
End: 2019-01-31

## 2019-01-31 DIAGNOSIS — F41.1 GAD (GENERALIZED ANXIETY DISORDER): ICD-10-CM

## 2019-01-31 RX ORDER — LORAZEPAM 1 MG/1
1 TABLET ORAL EVERY 8 HOURS PRN
Qty: 10 TABLET | Refills: 0 | Status: CANCELLED | OUTPATIENT
Start: 2019-01-31

## 2019-01-31 NOTE — TELEPHONE ENCOUNTER
Dr. Simmons,  Please review/sign or advise for refill request of:     LORazepam (ATIVAN) 1 MG tablet     LOV: 06/25/2018    :   - Last dispensed: 06/25/2018 #10/4 day supply prescribed by Dr. Simmons    Thank You!  Keke Chowdhury, RN  Triage Nurse

## 2019-02-05 DIAGNOSIS — F41.1 GAD (GENERALIZED ANXIETY DISORDER): ICD-10-CM

## 2019-02-05 RX ORDER — LORAZEPAM 1 MG/1
1 TABLET ORAL EVERY 8 HOURS PRN
Qty: 10 TABLET | Refills: 0 | Status: SHIPPED | OUTPATIENT
Start: 2019-02-05 | End: 2019-06-10

## 2019-02-05 NOTE — TELEPHONE ENCOUNTER
Prescription for   LORazepam (ATIVAN) 1 MG tablet  Faxed to [facility] at [phone]

## 2019-04-25 DIAGNOSIS — M05.79 SEROPOSITIVE RHEUMATOID ARTHRITIS OF MULTIPLE SITES (H): Primary | ICD-10-CM

## 2019-04-25 DIAGNOSIS — Z79.899 DRUG THERAPY: ICD-10-CM

## 2019-04-25 LAB
ALBUMIN SERPL-MCNC: 3.9 G/DL (ref 3.4–5)
ALT SERPL W P-5'-P-CCNC: 20 U/L (ref 0–50)
AST SERPL W P-5'-P-CCNC: 18 U/L (ref 0–45)
BASOPHILS # BLD AUTO: 0 10E9/L (ref 0–0.2)
BASOPHILS NFR BLD AUTO: 0.6 %
CREAT SERPL-MCNC: 0.78 MG/DL (ref 0.52–1.04)
CRP SERPL-MCNC: <2.9 MG/L (ref 0–8)
DIFFERENTIAL METHOD BLD: NORMAL
EOSINOPHIL # BLD AUTO: 0.1 10E9/L (ref 0–0.7)
EOSINOPHIL NFR BLD AUTO: 1.4 %
ERYTHROCYTE [DISTWIDTH] IN BLOOD BY AUTOMATED COUNT: 12.7 % (ref 10–15)
ERYTHROCYTE [SEDIMENTATION RATE] IN BLOOD BY WESTERGREN METHOD: 6 MM/H (ref 0–20)
GFR SERPL CREATININE-BSD FRML MDRD: >90 ML/MIN/{1.73_M2}
HCT VFR BLD AUTO: 41.7 % (ref 35–47)
HGB BLD-MCNC: 13.7 G/DL (ref 11.7–15.7)
LYMPHOCYTES # BLD AUTO: 2 10E9/L (ref 0.8–5.3)
LYMPHOCYTES NFR BLD AUTO: 39.9 %
MCH RBC QN AUTO: 31 PG (ref 26.5–33)
MCHC RBC AUTO-ENTMCNC: 32.9 G/DL (ref 31.5–36.5)
MCV RBC AUTO: 94 FL (ref 78–100)
MONOCYTES # BLD AUTO: 0.4 10E9/L (ref 0–1.3)
MONOCYTES NFR BLD AUTO: 8.4 %
NEUTROPHILS # BLD AUTO: 2.5 10E9/L (ref 1.6–8.3)
NEUTROPHILS NFR BLD AUTO: 49.7 %
PLATELET # BLD AUTO: 245 10E9/L (ref 150–450)
RBC # BLD AUTO: 4.42 10E12/L (ref 3.8–5.2)
WBC # BLD AUTO: 5 10E9/L (ref 4–11)

## 2019-04-25 PROCEDURE — 86140 C-REACTIVE PROTEIN: CPT | Performed by: NURSE PRACTITIONER

## 2019-04-25 PROCEDURE — 82040 ASSAY OF SERUM ALBUMIN: CPT | Performed by: NURSE PRACTITIONER

## 2019-04-25 PROCEDURE — 84450 TRANSFERASE (AST) (SGOT): CPT | Performed by: NURSE PRACTITIONER

## 2019-04-25 PROCEDURE — 36415 COLL VENOUS BLD VENIPUNCTURE: CPT | Performed by: NURSE PRACTITIONER

## 2019-04-25 PROCEDURE — 82565 ASSAY OF CREATININE: CPT | Performed by: NURSE PRACTITIONER

## 2019-04-25 PROCEDURE — 85652 RBC SED RATE AUTOMATED: CPT | Performed by: NURSE PRACTITIONER

## 2019-04-25 PROCEDURE — 84460 ALANINE AMINO (ALT) (SGPT): CPT | Performed by: NURSE PRACTITIONER

## 2019-04-25 PROCEDURE — 85025 COMPLETE CBC W/AUTO DIFF WBC: CPT | Performed by: NURSE PRACTITIONER

## 2019-05-14 ENCOUNTER — TRANSFERRED RECORDS (OUTPATIENT)
Dept: HEALTH INFORMATION MANAGEMENT | Facility: CLINIC | Age: 44
End: 2019-05-14

## 2019-05-17 ENCOUNTER — TRANSFERRED RECORDS (OUTPATIENT)
Dept: HEALTH INFORMATION MANAGEMENT | Facility: CLINIC | Age: 44
End: 2019-05-17

## 2019-06-08 ENCOUNTER — MYC MEDICAL ADVICE (OUTPATIENT)
Dept: FAMILY MEDICINE | Facility: CLINIC | Age: 44
End: 2019-06-08

## 2019-06-08 ENCOUNTER — MYC REFILL (OUTPATIENT)
Dept: FAMILY MEDICINE | Facility: CLINIC | Age: 44
End: 2019-06-08

## 2019-06-08 DIAGNOSIS — F41.1 GAD (GENERALIZED ANXIETY DISORDER): ICD-10-CM

## 2019-06-08 RX ORDER — LORAZEPAM 1 MG/1
1 TABLET ORAL EVERY 8 HOURS PRN
Qty: 10 TABLET | Refills: 0 | Status: CANCELLED | OUTPATIENT
Start: 2019-06-08

## 2019-06-10 RX ORDER — LORAZEPAM 1 MG/1
1 TABLET ORAL EVERY 8 HOURS PRN
Qty: 10 TABLET | Refills: 0 | Status: SHIPPED | OUTPATIENT
Start: 2019-06-10 | End: 2021-02-18

## 2019-06-10 NOTE — TELEPHONE ENCOUNTER
Dr. Simmons;  Please see patient's MyChart message    :  - Last dispensed: 02/06/2019 #10/4 day supply prescribed by Dr. Simmons    Thank You!  Keke Chowdhury, ELINOR  Triage Nurse

## 2019-06-10 NOTE — TELEPHONE ENCOUNTER
Prescription for   LORazepam (ATIVAN) 1 MG tablet  Faxed to [facility] pharmacy at [phone]

## 2019-06-10 NOTE — TELEPHONE ENCOUNTER
Prescription for   LORazepam (ATIVAN) 1 MG tablet  Faxed to [facility] pharmacy at [phone]

## 2019-08-15 ENCOUNTER — TRANSFERRED RECORDS (OUTPATIENT)
Dept: HEALTH INFORMATION MANAGEMENT | Facility: CLINIC | Age: 44
End: 2019-08-15

## 2019-08-25 ENCOUNTER — MYC MEDICAL ADVICE (OUTPATIENT)
Dept: FAMILY MEDICINE | Facility: CLINIC | Age: 44
End: 2019-08-25

## 2019-08-26 NOTE — TELEPHONE ENCOUNTER
Placed request on pt sons chart  No further action needed on this encounter    Sailaja Taylor RN   [facility]

## 2019-10-03 ENCOUNTER — HEALTH MAINTENANCE LETTER (OUTPATIENT)
Age: 44
End: 2019-10-03

## 2019-11-05 ENCOUNTER — MYC MEDICAL ADVICE (OUTPATIENT)
Dept: FAMILY MEDICINE | Facility: CLINIC | Age: 44
End: 2019-11-05

## 2019-11-05 DIAGNOSIS — F41.1 GAD (GENERALIZED ANXIETY DISORDER): Primary | ICD-10-CM

## 2019-11-05 DIAGNOSIS — F40.243 FEAR OF FLYING: ICD-10-CM

## 2019-11-06 RX ORDER — CLONAZEPAM 0.5 MG/1
0.5 TABLET ORAL DAILY PRN
Qty: 10 TABLET | Refills: 0 | Status: SHIPPED | OUTPATIENT
Start: 2019-11-06 | End: 2022-07-14

## 2019-11-06 NOTE — TELEPHONE ENCOUNTER
Dr. Simmons    Please see patient's my chart message.     Thanks  Domitila Adam RN   [facility]

## 2019-11-06 NOTE — TELEPHONE ENCOUNTER
I called patient   OK to try   Orders Placed This Encounter     clonazePAM (KLONOPIN) 0.5 MG tablet     Sig: Take 1 tablet (0.5 mg) by mouth daily as needed for anxiety     Dispense:  10 tablet     Refill:  0     But needs to see me in there nexth month

## 2019-11-14 ENCOUNTER — TRANSFERRED RECORDS (OUTPATIENT)
Dept: HEALTH INFORMATION MANAGEMENT | Facility: CLINIC | Age: 44
End: 2019-11-14

## 2019-11-25 ENCOUNTER — MYC MEDICAL ADVICE (OUTPATIENT)
Dept: FAMILY MEDICINE | Facility: CLINIC | Age: 44
End: 2019-11-25

## 2019-11-25 DIAGNOSIS — Z13.6 CARDIOVASCULAR SCREENING; LDL GOAL LESS THAN 160: Primary | ICD-10-CM

## 2019-11-25 DIAGNOSIS — M06.9 RHEUMATOID ARTHRITIS INVOLVING MULTIPLE SITES, UNSPECIFIED RHEUMATOID FACTOR PRESENCE: ICD-10-CM

## 2019-11-25 NOTE — TELEPHONE ENCOUNTER
Dr. Simmons,    Please see patients my chart message. Please send back if you are ok signing labs so we can my chart message patient back.    Lab cued.    Thanks,  Domitila Adam RN   [facility]

## 2019-11-29 ENCOUNTER — OFFICE VISIT (OUTPATIENT)
Dept: FAMILY MEDICINE | Facility: CLINIC | Age: 44
End: 2019-11-29
Payer: COMMERCIAL

## 2019-11-29 VITALS
DIASTOLIC BLOOD PRESSURE: 80 MMHG | OXYGEN SATURATION: 99 % | BODY MASS INDEX: 23.73 KG/M2 | HEIGHT: 64 IN | TEMPERATURE: 98.4 F | HEART RATE: 65 BPM | SYSTOLIC BLOOD PRESSURE: 114 MMHG | WEIGHT: 139 LBS | RESPIRATION RATE: 16 BRPM

## 2019-11-29 DIAGNOSIS — Z00.00 ROUTINE GENERAL MEDICAL EXAMINATION AT A HEALTH CARE FACILITY: Primary | ICD-10-CM

## 2019-11-29 DIAGNOSIS — F41.8 SITUATIONAL ANXIETY: ICD-10-CM

## 2019-11-29 DIAGNOSIS — M06.9 RHEUMATOID ARTHRITIS INVOLVING MULTIPLE SITES, UNSPECIFIED RHEUMATOID FACTOR PRESENCE: ICD-10-CM

## 2019-11-29 DIAGNOSIS — Z13.6 CARDIOVASCULAR SCREENING; LDL GOAL LESS THAN 160: ICD-10-CM

## 2019-11-29 LAB
ALBUMIN SERPL-MCNC: 3.7 G/DL (ref 3.4–5)
ALP SERPL-CCNC: 52 U/L (ref 40–150)
ALT SERPL W P-5'-P-CCNC: 21 U/L (ref 0–50)
ANION GAP SERPL CALCULATED.3IONS-SCNC: 6 MMOL/L (ref 3–14)
AST SERPL W P-5'-P-CCNC: 13 U/L (ref 0–45)
BILIRUB SERPL-MCNC: 0.6 MG/DL (ref 0.2–1.3)
BUN SERPL-MCNC: 12 MG/DL (ref 7–30)
CALCIUM SERPL-MCNC: 8.7 MG/DL (ref 8.5–10.1)
CHLORIDE SERPL-SCNC: 105 MMOL/L (ref 94–109)
CHOLEST SERPL-MCNC: 185 MG/DL
CO2 SERPL-SCNC: 26 MMOL/L (ref 20–32)
CREAT SERPL-MCNC: 0.65 MG/DL (ref 0.52–1.04)
ERYTHROCYTE [DISTWIDTH] IN BLOOD BY AUTOMATED COUNT: 12.6 % (ref 10–15)
GFR SERPL CREATININE-BSD FRML MDRD: >90 ML/MIN/{1.73_M2}
GLUCOSE SERPL-MCNC: 88 MG/DL (ref 70–99)
HCT VFR BLD AUTO: 41.5 % (ref 35–47)
HDLC SERPL-MCNC: 77 MG/DL
HGB BLD-MCNC: 13.7 G/DL (ref 11.7–15.7)
LDLC SERPL CALC-MCNC: 96 MG/DL
MCH RBC QN AUTO: 31.1 PG (ref 26.5–33)
MCHC RBC AUTO-ENTMCNC: 33 G/DL (ref 31.5–36.5)
MCV RBC AUTO: 94 FL (ref 78–100)
NONHDLC SERPL-MCNC: 108 MG/DL
PLATELET # BLD AUTO: 273 10E9/L (ref 150–450)
POTASSIUM SERPL-SCNC: 3.9 MMOL/L (ref 3.4–5.3)
PROT SERPL-MCNC: 7 G/DL (ref 6.8–8.8)
RBC # BLD AUTO: 4.41 10E12/L (ref 3.8–5.2)
SODIUM SERPL-SCNC: 137 MMOL/L (ref 133–144)
TRIGL SERPL-MCNC: 61 MG/DL
TSH SERPL DL<=0.005 MIU/L-ACNC: 1.2 MU/L (ref 0.4–4)
WBC # BLD AUTO: 9.2 10E9/L (ref 4–11)

## 2019-11-29 PROCEDURE — 36415 COLL VENOUS BLD VENIPUNCTURE: CPT | Performed by: FAMILY MEDICINE

## 2019-11-29 PROCEDURE — 85027 COMPLETE CBC AUTOMATED: CPT | Performed by: FAMILY MEDICINE

## 2019-11-29 PROCEDURE — 99213 OFFICE O/P EST LOW 20 MIN: CPT | Mod: 25 | Performed by: FAMILY MEDICINE

## 2019-11-29 PROCEDURE — 80061 LIPID PANEL: CPT | Performed by: FAMILY MEDICINE

## 2019-11-29 PROCEDURE — 80053 COMPREHEN METABOLIC PANEL: CPT | Performed by: FAMILY MEDICINE

## 2019-11-29 PROCEDURE — 99396 PREV VISIT EST AGE 40-64: CPT | Mod: 25 | Performed by: FAMILY MEDICINE

## 2019-11-29 PROCEDURE — 84443 ASSAY THYROID STIM HORMONE: CPT | Performed by: FAMILY MEDICINE

## 2019-11-29 PROCEDURE — 90686 IIV4 VACC NO PRSV 0.5 ML IM: CPT | Performed by: FAMILY MEDICINE

## 2019-11-29 PROCEDURE — 90471 IMMUNIZATION ADMIN: CPT | Performed by: FAMILY MEDICINE

## 2019-11-29 RX ORDER — METHYLPREDNISOLONE 4 MG/1
20 TABLET ORAL DAILY
COMMUNITY
Start: 2019-11-14 | End: 2022-10-14

## 2019-11-29 ASSESSMENT — MIFFLIN-ST. JEOR: SCORE: 1270.5

## 2019-11-29 NOTE — LETTER
November 29, 2019      Ulises Butler  [address] [place] [address]        Dear ,    We are writing to inform you of your test results.    Your test results fall within the expected range(s) or remain unchanged from previous results.  Please continue with current treatment plan.    Resulted Orders   **TSH with free T4 reflex FUTURE anytime   Result Value Ref Range    TSH 1.20 0.40 - 4.00 mU/L   **Comprehensive metabolic panel FUTURE anytime   Result Value Ref Range    Sodium 137 133 - 144 mmol/L    Potassium 3.9 3.4 - 5.3 mmol/L    Chloride 105 94 - 109 mmol/L    Carbon Dioxide 26 20 - 32 mmol/L    Anion Gap 6 3 - 14 mmol/L    Glucose 88 70 - 99 mg/dL      Comment:      Fasting specimen    Urea Nitrogen 12 7 - 30 mg/dL    Creatinine 0.65 0.52 - 1.04 mg/dL    GFR Estimate >90 >60 mL/min/[1.73_m2]      Comment:      Non  GFR Calc  Starting 12/18/2018, serum creatinine based estimated GFR (eGFR) will be   calculated using the Chronic Kidney Disease Epidemiology Collaboration   (CKD-EPI) equation.      GFR Estimate If Black >90 >60 mL/min/[1.73_m2]      Comment:       GFR Calc  Starting 12/18/2018, serum creatinine based estimated GFR (eGFR) will be   calculated using the Chronic Kidney Disease Epidemiology Collaboration   (CKD-EPI) equation.      Calcium 8.7 8.5 - 10.1 mg/dL    Bilirubin Total 0.6 0.2 - 1.3 mg/dL    Albumin 3.7 3.4 - 5.0 g/dL    Protein Total 7.0 6.8 - 8.8 g/dL    Alkaline Phosphatase 52 40 - 150 U/L    ALT 21 0 - 50 U/L    AST 13 0 - 45 U/L   Lipid panel reflex to direct LDL Fasting   Result Value Ref Range    Cholesterol 185 <200 mg/dL    Triglycerides 61 <150 mg/dL      Comment:      Fasting specimen    HDL Cholesterol 77 >49 mg/dL    LDL Cholesterol Calculated 96 <100 mg/dL      Comment:      Desirable:       <100 mg/dl    Non HDL Cholesterol 108 <130 mg/dL   CBC with platelets   Result Value Ref Range    WBC 9.2 4.0 - 11.0 10e9/L    RBC Count  4.41 3.8 - 5.2 10e12/L    Hemoglobin 13.7 11.7 - 15.7 g/dL    Hematocrit 41.5 35.0 - 47.0 %    MCV 94 78 - 100 fl    MCH 31.1 26.5 - 33.0 pg    MCHC 33.0 31.5 - 36.5 g/dL    RDW 12.6 10.0 - 15.0 %    Platelet Count 273 150 - 450 10e9/L       If you have any questions or concerns, please call the clinic at the number listed above.       Sincerely,        Sohail Simmons MD
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
## 2019-12-16 ENCOUNTER — HEALTH MAINTENANCE LETTER (OUTPATIENT)
Age: 44
End: 2019-12-16

## 2020-01-01 ENCOUNTER — MYC MEDICAL ADVICE (OUTPATIENT)
Dept: FAMILY MEDICINE | Facility: CLINIC | Age: 45
End: 2020-01-01

## 2020-02-14 ENCOUNTER — MYC MEDICAL ADVICE (OUTPATIENT)
Dept: FAMILY MEDICINE | Facility: CLINIC | Age: 45
End: 2020-02-14

## 2020-02-14 NOTE — TELEPHONE ENCOUNTER
Dr. Simmons,    Please see patients my chart message. When searching the name of the medication Ceftnaxonc, it comes up as Ceftriaxone. Please advise if patient ok to take this.    Thanks  Domitila Adam RN   [facility]

## 2020-02-20 ENCOUNTER — TRANSFERRED RECORDS (OUTPATIENT)
Dept: HEALTH INFORMATION MANAGEMENT | Facility: CLINIC | Age: 45
End: 2020-02-20

## 2020-02-21 ENCOUNTER — ALLIED HEALTH/NURSE VISIT (OUTPATIENT)
Dept: NURSING | Facility: CLINIC | Age: 45
End: 2020-02-21
Payer: COMMERCIAL

## 2020-02-21 DIAGNOSIS — Z48.02 ENCOUNTER FOR REMOVAL OF SUTURES: Primary | ICD-10-CM

## 2020-02-21 PROCEDURE — 99207 ZZC NO CHARGE NURSE ONLY: CPT

## 2020-02-21 NOTE — PROGRESS NOTES
Ulises Butler presents to the clinic for removal of sutures and sutures,staples, steri strips. The patient has had sutures in place for 8 days. There has been no patient reported signs or symptoms of infection or drainage. 1  sutures and sutures,staples, staple, steri strips are seen and located on the chin. Tetanus status is up to date. All sutures and sutures,staples, steri strips were easily removed today. Routine wound care discussed by the RN or provider. The patient will follow up as needed.    Domitila Adam RN   [facility]

## 2020-03-09 ENCOUNTER — OFFICE VISIT (OUTPATIENT)
Dept: FAMILY MEDICINE | Facility: CLINIC | Age: 45
End: 2020-03-09
Payer: COMMERCIAL

## 2020-03-09 VITALS
HEART RATE: 63 BPM | BODY MASS INDEX: 23.86 KG/M2 | OXYGEN SATURATION: 100 % | WEIGHT: 139 LBS | TEMPERATURE: 97.2 F | SYSTOLIC BLOOD PRESSURE: 110 MMHG | DIASTOLIC BLOOD PRESSURE: 72 MMHG

## 2020-03-09 DIAGNOSIS — Z00.00 ANNUAL VISIT FOR GENERAL ADULT MEDICAL EXAMINATION WITHOUT ABNORMAL FINDINGS: Primary | ICD-10-CM

## 2020-03-09 DIAGNOSIS — F41.8 SITUATIONAL ANXIETY: ICD-10-CM

## 2020-03-09 DIAGNOSIS — Z12.4 SCREENING FOR MALIGNANT NEOPLASM OF CERVIX: ICD-10-CM

## 2020-03-09 DIAGNOSIS — M06.9 RHEUMATOID ARTHRITIS INVOLVING MULTIPLE SITES, UNSPECIFIED RHEUMATOID FACTOR PRESENCE: ICD-10-CM

## 2020-03-09 PROCEDURE — 99396 PREV VISIT EST AGE 40-64: CPT | Performed by: FAMILY MEDICINE

## 2020-03-09 PROCEDURE — G0145 SCR C/V CYTO,THINLAYER,RESCR: HCPCS | Performed by: FAMILY MEDICINE

## 2020-03-09 PROCEDURE — 87624 HPV HI-RISK TYP POOLED RSLT: CPT | Performed by: FAMILY MEDICINE

## 2020-03-09 NOTE — PROGRESS NOTES
Subjective     Ulises Butler is a 45 year old female who presents to clinic today for the following health issues:    HPI   Patient is here today for annaul exam/ Pap smear.     Encounter Diagnoses   Name Primary?     Annual visit for general adult medical examination without abnormal findings Yes     Screening for malignant neoplasm of cervix      Rheumatoid arthritis involving multiple sites, unspecified rheumatoid factor presence (H), Well controlled on medication       Situational anxiety, doing well         Current Outpatient Medications   Medication Sig Dispense Refill     tocilizumab (ACTEMRA) 162 MG/0.9ML subcutaneous injection Inject 162 mg Subcutaneous Once weekly       adalimumab (HUMIRA) 20 MG/0.4ML KIT Inject 20 mg Subcutaneous every 14 days       clomiPHENE (CLOMID) 50 MG tablet Days 3-7 of cycle (Patient not taking: Reported on 11/29/2019) 5 tablet 3     clonazePAM (KLONOPIN) 0.5 MG tablet Take 1 tablet (0.5 mg) by mouth daily as needed for anxiety (Patient not taking: Reported on 11/29/2019) 10 tablet 0     FOLIC ACID PO Take 1 mg by mouth daily       LORazepam (ATIVAN) 1 MG tablet Take 1 tablet (1 mg) by mouth every 8 hours as needed for anxiety (Patient not taking: Reported on 11/29/2019) 10 tablet 0     Methotrexate Sodium (METHOTREXATE PO) Take 2.5 mg by mouth       methylPREDNISolone (MEDROL) 4 MG tablet Take 20 mg by mouth daily           Reviewed and updated as needed this visit by Provider         Review of Systems   ROS COMP: Constitutional, HEENT, cardiovascular, pulmonary, gi and gu systems are negative, except as otherwise noted.      Objective    /72   Pulse 63   Temp 97.2  F (36.2  C) (Oral)   Wt 63 kg (139 lb)   LMP 02/12/2020 (Exact Date)   SpO2 100%   Breastfeeding No   BMI 23.86 kg/m    Body mass index is 23.86 kg/m .  Physical Exam   GENERAL: healthy, alert and no distress  EYES: Eyes grossly normal to inspection, PERRL and conjunctivae and sclerae normal  HENT:  ear canals and TM's normal, nose and mouth without ulcers or lesions  NECK: no adenopathy, no asymmetry, masses, or scars and thyroid normal to palpation  RESP: lungs clear to auscultation - no rales, rhonchi or wheezes  BREAST: normal without masses, tenderness or nipple discharge, no palpable axillary masses or adenopathy and implant noted  CV: regular rate and rhythm, normal S1 S2, no S3 or S4, no murmur, click or rub, no peripheral edema and peripheral pulses strong  ABDOMEN: soft, nontender, no hepatosplenomegaly, no masses and bowel sounds normal   (female): normal female external genitalia, normal urethral meatus, vaginal mucosa, normal cervix/adnexa/uterus without masses or discharge  SKIN: no suspicious lesions or rashes  NEURO: Normal strength and tone, mentation intact and speech normal  PSYCH: mentation appears normal, affect normal/bright  LYMPH: no cervical, supraclavicular, axillary, or inguinal adenopathy    Diagnostic Test Results:  Labs reviewed in Epic        Assessment & Plan     1. Annual visit for general adult medical examination without abnormal findings  Doing well    2. Screening for malignant neoplasm of cervix  sent  - Pap imaged thin layer screen with HPV - recommended age 30 - 65 years (select HPV order below)  - HPV High Risk Types DNA Cervical    3. Rheumatoid arthritis involving multiple sites, unspecified rheumatoid factor presence (H)  Well controlled   Follow up with consultant as planned.     4. Situational anxiety  reviewd medications          Regular exercise  See Patient Instructions    No follow-ups on file.    Sohail Simmons MD  [facility] [place]

## 2020-03-12 LAB
COPATH REPORT: NORMAL
PAP: NORMAL

## 2020-03-16 ENCOUNTER — MYC MEDICAL ADVICE (OUTPATIENT)
Dept: FAMILY MEDICINE | Facility: CLINIC | Age: 45
End: 2020-03-16

## 2020-03-16 LAB
FINAL DIAGNOSIS: NORMAL
HPV HR 12 DNA CVX QL NAA+PROBE: NEGATIVE
HPV16 DNA SPEC QL NAA+PROBE: NEGATIVE
HPV18 DNA SPEC QL NAA+PROBE: NEGATIVE
SPECIMEN DESCRIPTION: NORMAL
SPECIMEN SOURCE CVX/VAG CYTO: NORMAL

## 2020-03-17 NOTE — TELEPHONE ENCOUNTER
Dr. Simmons,    Please see patients my chart message and message below about calling into clinic.    Thanks  Domitila Adam RN   [facility]

## 2020-03-17 NOTE — TELEPHONE ENCOUNTER
Pt is wanting to speak to someone regarding their [employer] message below. Please call patient at [phone] [email or web address]

## 2020-04-20 ENCOUNTER — TRANSFERRED RECORDS (OUTPATIENT)
Dept: HEALTH INFORMATION MANAGEMENT | Facility: CLINIC | Age: 45
End: 2020-04-20

## 2020-05-09 ENCOUNTER — OFFICE VISIT (OUTPATIENT)
Dept: URGENT CARE | Facility: URGENT CARE | Age: 45
End: 2020-05-09
Payer: COMMERCIAL

## 2020-05-09 VITALS
HEIGHT: 64 IN | DIASTOLIC BLOOD PRESSURE: 54 MMHG | BODY MASS INDEX: 23.9 KG/M2 | TEMPERATURE: 98 F | SYSTOLIC BLOOD PRESSURE: 126 MMHG | OXYGEN SATURATION: 98 % | WEIGHT: 140 LBS | HEART RATE: 72 BPM

## 2020-05-09 DIAGNOSIS — J01.90 ACUTE NON-RECURRENT SINUSITIS, UNSPECIFIED LOCATION: Primary | ICD-10-CM

## 2020-05-09 PROCEDURE — 99214 OFFICE O/P EST MOD 30 MIN: CPT

## 2020-05-09 RX ORDER — FEXOFENADINE HCL 180 MG/1
180 TABLET ORAL DAILY
COMMUNITY
End: 2022-08-14

## 2020-05-09 RX ORDER — DOXYCYCLINE 100 MG/1
100 TABLET ORAL 2 TIMES DAILY
Qty: 14 TABLET | Refills: 0 | Status: SHIPPED | OUTPATIENT
Start: 2020-05-09 | End: 2020-05-16

## 2020-05-09 ASSESSMENT — MIFFLIN-ST. JEOR: SCORE: 1270.04

## 2020-05-09 NOTE — PATIENT INSTRUCTIONS
Sinusitis, possible tmj  Will treat with doxycycline for 7 days  Also advise saline rinse, flonase  Follow up if not improving
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
## 2020-06-28 ENCOUNTER — MYC MEDICAL ADVICE (OUTPATIENT)
Dept: FAMILY MEDICINE | Facility: CLINIC | Age: 45
End: 2020-06-28

## 2020-06-29 ENCOUNTER — TELEPHONE (OUTPATIENT)
Dept: FAMILY MEDICINE | Facility: CLINIC | Age: 45
End: 2020-06-29

## 2020-06-29 DIAGNOSIS — N39.0 URINARY TRACT INFECTION: Primary | ICD-10-CM

## 2020-06-29 DIAGNOSIS — R30.0 DYSURIA: ICD-10-CM

## 2020-06-29 NOTE — TELEPHONE ENCOUNTER
Dr. Simmons,     See mychart msg     Can we start with a video visit? Or do you think Pt should be seen in UC or in person here?     Michael Villalba RN   [facility]

## 2020-06-29 NOTE — TELEPHONE ENCOUNTER
Spoke with patient and scheduled lab appt for lowell Adam RN   [facility] [place]

## 2020-06-29 NOTE — TELEPHONE ENCOUNTER
Dr. Simmons,    Please see message below. / cued. Please advise.    Thanks  Domitila Adam RN   [facility]

## 2020-06-29 NOTE — TELEPHONE ENCOUNTER
Reason for call:  Other   Patient called regarding (reason for call): appointment  Additional comments: pt is having symptoms of a UTI and would like to come in for a UA. Next available in clinic appt was 7/1, which patient did not want to wait for. She is hoping to just stop into the lab to do the test    Phone number to reach patient:  Home number on file [phone] (home)    Best Time:  n/a    Can we leave a detailed message on this number?  YES    Travel screening: Not Applicable

## 2020-06-30 ENCOUNTER — TELEPHONE (OUTPATIENT)
Dept: FAMILY MEDICINE | Facility: CLINIC | Age: 45
End: 2020-06-30

## 2020-06-30 DIAGNOSIS — N30.01 ACUTE CYSTITIS WITH HEMATURIA: Primary | ICD-10-CM

## 2020-06-30 DIAGNOSIS — R30.0 DYSURIA: ICD-10-CM

## 2020-06-30 LAB
ALBUMIN UR-MCNC: NEGATIVE MG/DL
APPEARANCE UR: CLEAR
BACTERIA #/AREA URNS HPF: ABNORMAL /HPF
BILIRUB UR QL STRIP: NEGATIVE
COLOR UR AUTO: YELLOW
GLUCOSE UR STRIP-MCNC: NEGATIVE MG/DL
HGB UR QL STRIP: ABNORMAL
KETONES UR STRIP-MCNC: NEGATIVE MG/DL
LEUKOCYTE ESTERASE UR QL STRIP: NEGATIVE
NITRATE UR QL: NEGATIVE
NON-SQ EPI CELLS #/AREA URNS LPF: ABNORMAL /LPF
PH UR STRIP: 6 PH (ref 5–7)
RBC #/AREA URNS AUTO: ABNORMAL /HPF
SOURCE: ABNORMAL
SP GR UR STRIP: <=1.005 (ref 1–1.03)
UROBILINOGEN UR STRIP-ACNC: 0.2 EU/DL (ref 0.2–1)
WBC #/AREA URNS AUTO: ABNORMAL /HPF

## 2020-06-30 PROCEDURE — 87086 URINE CULTURE/COLONY COUNT: CPT | Performed by: FAMILY MEDICINE

## 2020-06-30 PROCEDURE — 81001 URINALYSIS AUTO W/SCOPE: CPT | Performed by: FAMILY MEDICINE

## 2020-06-30 RX ORDER — NITROFURANTOIN 25; 75 MG/1; MG/1
100 CAPSULE ORAL 2 TIMES DAILY
Qty: 10 CAPSULE | Refills: 0 | Status: SHIPPED | OUTPATIENT
Start: 2020-06-30 | End: 2020-07-05

## 2020-06-30 NOTE — TELEPHONE ENCOUNTER
I called patient she has minimal symptoms    if worse use   Orders Placed This Encounter     nitroFURantoin macrocrystal-monohydrate (MACROBID) 100 MG capsule     Sig: Take 1 capsule (100 mg) by mouth 2 times daily for 5 days     Dispense:  10 capsule     Refill:  0      pending the [facility] results

## 2020-07-01 LAB
BACTERIA SPEC CULT: NO GROWTH
SPECIMEN SOURCE: NORMAL

## 2020-08-27 ENCOUNTER — TRANSFERRED RECORDS (OUTPATIENT)
Dept: HEALTH INFORMATION MANAGEMENT | Facility: CLINIC | Age: 45
End: 2020-08-27

## 2020-10-28 ENCOUNTER — NURSE TRIAGE (OUTPATIENT)
Dept: FAMILY MEDICINE | Facility: CLINIC | Age: 45
End: 2020-10-28

## 2020-10-28 ENCOUNTER — OFFICE VISIT (OUTPATIENT)
Dept: FAMILY MEDICINE | Facility: CLINIC | Age: 45
End: 2020-10-28
Payer: COMMERCIAL

## 2020-10-28 VITALS
WEIGHT: 134.25 LBS | DIASTOLIC BLOOD PRESSURE: 84 MMHG | SYSTOLIC BLOOD PRESSURE: 114 MMHG | TEMPERATURE: 98.5 F | HEART RATE: 59 BPM | BODY MASS INDEX: 22.92 KG/M2 | HEIGHT: 64 IN | RESPIRATION RATE: 18 BRPM | OXYGEN SATURATION: 98 %

## 2020-10-28 DIAGNOSIS — N92.1 METRORRHAGIA: Primary | ICD-10-CM

## 2020-10-28 PROCEDURE — 99214 OFFICE O/P EST MOD 30 MIN: CPT | Performed by: FAMILY MEDICINE

## 2020-10-28 ASSESSMENT — MIFFLIN-ST. JEOR: SCORE: 1244.2

## 2020-10-28 NOTE — PROGRESS NOTES
"Subjective     Ulises Butler is a 45 year old female who presents to clinic today for the following health issues:    HPI         Menstrual Concern  Onset/Duration: started at 9:00am this morning and has been bleeding ever since. She said this morning her sweat pants were completely soaked with a clear like discharge   Description:   Duration of bleeding episodes: 1 days  Frequency of periods: (1st day of one to 1st day of next):  every 28 days  Describe bleeding/flow:   Clots: YES  Number of pads/day: 1- completely soaked         Cramping: moderate  Accompanying Signs & Symptoms:  Lightheadedness: no  Temperature intolerance: no  Nosebleeds/Easy bruising: no  Vaginal Discharge: YES  Acne: no  Change in body hair: no  History:  Patient's last menstrual period was 10/15/2020 (approximate).     She said that she had an ovarian cyst that ruptured back in 2008 or 2009 and she said at the time she had cramping and bloating in her lower extremity and this time it feels like how it did when she had the cyst rupture  .  Previous normal periods: YES  Contraceptive use: NO  Sexually active: YES  Any bleeding after intercourse: no  Abnormal PAP Smears: no        Review of Systems   Constitutional, HEENT, cardiovascular, pulmonary, gi and gu systems are negative, except as otherwise noted.      Objective    /84   Pulse 59   Temp 98.5  F (36.9  C) (Temporal)   Resp 18   Ht 1.626 m (5' 4.02\")   Wt 60.9 kg (134 lb 4 oz)   LMP 10/15/2020 (Approximate)   SpO2 98%   BMI 23.03 kg/m    Body mass index is 23.03 kg/m .  Physical Exam GENERAL: healthy, alert and no distress  ABDOMEN: soft, nontender, no hepatosplenomegaly, no masses and bowel sounds normal  MS: no gross musculoskeletal defects noted, no edema  SKIN: no suspicious lesions or rashes        Metrorrhagia  Possibilities include miscarriage, perimenopausal bleeding, recurrence of ovarian cyst.  - US Pelvic Complete with Transvaginal; Future  - HCG Qual, Urine- "  [facility] ([id number]); Future        Patient Instructions   Call 7 am to schedule ultrasound,   Go to ER if fever, bleeding, vomiting    Sreekanth Jimenez MD  [facility] [place]

## 2020-10-28 NOTE — TELEPHONE ENCOUNTER
Reason for call:  Other   Patient called regarding (reason for call): call back  Additional comments: Pt is having questions about abnormal spotting and wanted to talk to a nurse. Pt of Dr. Simmons, not established with our OBGYN    Phone number to reach patient:  Home number on file [phone] (home)    Best Time:  n/a    Can we leave a detailed message on this number?  YES    Travel screening: Not Applicable

## 2020-10-28 NOTE — TELEPHONE ENCOUNTER
"  Additional Information    Negative: SEVERE vaginal bleeding (e.g., continuous red blood from vagina, or large blood clots) and very weak (can't stand)    Negative: Passed out (i.e., fainted, collapsed and was not responding)    Negative: Difficult to awaken or acting confused (e.g., disoriented, slurred speech)    Negative: Shock suspected (e.g., cold/pale/clammy skin, too weak to stand, low BP, rapid pulse)    Negative: Sounds like a life-threatening emergency to the triager    Negative: Pregnant > 20 weeks (5 months or more)    Negative: Pregnant < 20 weeks (less than 5 months)    Negative: Postpartum (from 0 to 6 weeks after delivery)    Negative: Vaginal discharge is the main symptom and bleeding is slight    Negative: SEVERE abdominal pain (e.g., excruciating)    Negative: SEVERE dizziness (e.g., unable to stand, requires support to walk, feels like passing out now)    Negative: SEVERE vaginal bleeding (i.e., soaking 2 pads or tampons per hour and present 2 or more hours; 1 menstrual cup every 2 hours)    Negative: MODERATE vaginal bleeding (i.e., soaking pad or tampon per hour and present > 6 hours; 1 menstrual cup every 6 hours)    Negative: Constant abdominal pain lasting > 2 hours    Negative: Pale skin (pallor) of new onset or worsening    Negative: Taking Coumadin (warfarin) or other strong blood thinner, or known bleeding disorder (e.g., thrombocytopenia)    Negative: Skin bruises or nosebleed and not caused by an injury    Negative: Bleeding/spotting after procedure (e.g., biopsy) or pelvic examination (e.g., pap smear) that persists > 3 days    Negative: Patient sounds very sick or weak to the triager    Negative: Passed tissue (e.g., gray-white)    Patient wants to be seen    Answer Assessment - Initial Assessment Questions  1. AMOUNT: \"Describe the bleeding that you are having.\"     - SPOTTING: spotting, or pinkish / brownish mucous discharge; does not fill panti-liner or pad     - MILD:  less than " "1 pad / hour; less than patient's usual menstrual bleeding    - MODERATE: 1-2 pads / hour; 1 menstrual cup every 6 hours; small-medium blood clots (e.g., pea, grape, small coin)    - SEVERE: soaking 2 or more pads/hour for 2 or more hours; 1 menstrual cup every 2 hours; bleeding not contained by pads or continuous red blood from vagina; large blood clots (e.g., golf ball, large coin)       Severe, brownish watery discharge and thick  2. ONSET: \"When did the bleeding begin?\" \"Is it continuing now?\"      One episode today  3. MENSTRUAL PERIOD: \"When was the last normal menstrual period?\" \"How is this different than your period?\"      12 days ago, Friday the 16th  4. REGULARITY: \"How regular are your periods?\"      Usually regular, sometimes small period/bleeding in between regular periods  5. ABDOMINAL PAIN: \"Do you have any pain?\" \"How bad is the pain?\"  (e.g., Scale 1-10; mild, moderate, or severe)    - MILD (1-3): doesn't interfere with normal activities, abdomen soft and not tender to touch     - MODERATE (4-7): interferes with normal activities or awakens from sleep, tender to touch     - SEVERE (8-10): excruciating pain, doubled over, unable to do any normal activities       Cramping and low back cramps waking her up  6. PREGNANCY: \"Could you be pregnant?\" \"Are you sexually active?\" \"Did you recently give birth?\"      Could be possible, and is sexually active  Tried to get pregnant 2 years ago, did HOLLY  7. BREASTFEEDING: \"Are you breastfeeding?\"      No  8. HORMONES: \"Are you taking any hormone medications, prescription or OTC?\" (e.g., birth control pills, estrogen)      No  9. BLOOD THINNERS: \"Do you take any blood thinners?\" (e.g., Coumadin/warfarin, Pradaxa/dabigatran, aspirin)      No  10. CAUSE: \"What do you think is causing the bleeding?\" (e.g., recent gyn surgery, recent gyn procedure; known bleeding disorder, cervical cancer, polycystic ovarian disease, fibroids)          unknown  11. HEMODYNAMIC STATUS: " "\"Are you weak or feeling lightheaded?\" If so, ask: \"Can you stand and walk normally?\"         No other symptoms  12. OTHER SYMPTOMS: \"What other symptoms are you having with the bleeding?\" (e.g., passed tissue, vaginal discharge, fever, menstrual-type cramps)        Menstrual type cramps    Protocols used: VAGINAL BLEEDING - [id number]    Domitila Adam RN   [facility]   "

## 2020-10-28 NOTE — Clinical Note
Chuck-please page me at [phone] if there is any questions.  It looks like she has run into problems scheduling an ultrasound however I said if we were booked on the seventh floor that she should call radiology at [place] and get scheduled there.  Also I realize that she needs a pregnancy test that she should get today as I had already sent lab home.  Thanks, Sreekanth

## 2020-10-29 ENCOUNTER — HOSPITAL ENCOUNTER (OUTPATIENT)
Dept: ULTRASOUND IMAGING | Facility: CLINIC | Age: 45
Discharge: HOME | End: 2020-10-29
Attending: FAMILY MEDICINE | Admitting: FAMILY MEDICINE
Payer: COMMERCIAL

## 2020-10-29 ENCOUNTER — TELEPHONE (OUTPATIENT)
Dept: FAMILY MEDICINE | Facility: CLINIC | Age: 45
End: 2020-10-29

## 2020-10-29 DIAGNOSIS — N92.1 METRORRHAGIA: ICD-10-CM

## 2020-10-29 PROCEDURE — 76830 TRANSVAGINAL US NON-OB: CPT | Mod: 26 | Performed by: RADIOLOGY

## 2020-10-29 PROCEDURE — 76856 US EXAM PELVIC COMPLETE: CPT | Mod: 26 | Performed by: RADIOLOGY

## 2020-10-29 PROCEDURE — 76856 US EXAM PELVIC COMPLETE: CPT

## 2020-10-29 NOTE — TELEPHONE ENCOUNTER
I called her feeling somewhat better, had mostly vaginal fluids before  Follow up by phone in 1 day

## 2020-10-29 NOTE — TELEPHONE ENCOUNTER
Patient calling to schedule follow up with OB/GYN after imaging today. No appointments available this week. Was told by Dr. Jimenez at appointment yesterday to call Dr. Simmons if this was the case so a plan could be made. Routing to him and care team.

## 2020-10-30 ENCOUNTER — MYC MEDICAL ADVICE (OUTPATIENT)
Dept: FAMILY MEDICINE | Facility: CLINIC | Age: 45
End: 2020-10-30

## 2020-12-09 ENCOUNTER — MYC MEDICAL ADVICE (OUTPATIENT)
Dept: FAMILY MEDICINE | Facility: CLINIC | Age: 45
End: 2020-12-09

## 2020-12-10 ENCOUNTER — NURSE TRIAGE (OUTPATIENT)
Dept: FAMILY MEDICINE | Facility: CLINIC | Age: 45
End: 2020-12-10

## 2020-12-10 NOTE — TELEPHONE ENCOUNTER
actenra injection weekly, Sunday, she will be checking with her rheumatologist about med and if she should delay taking    Copied from pt [employer] message    I tested positive for COVID yesterday, and just got my results tonight.  I got tested because I was feeling terrible for about 48 hours - headache, exhaustion, cough, sore throat, low grade fever.  [place] [employer] [place] via [place] saliva test.       I believe I was exposed at [facility] resort on Saturday.  We came home on Sunday and I took my Actemra on Sunday night.  I woke up early Monday feeling miserable.     I have a few questions and wonder if you would have time to give me a call.     Thank you,  Ulises Taylor RN   [facility]        Reason for Disposition    [1] COVID-19 diagnosed by positive lab test AND [2] mild symptoms (e.g., cough, fever, others) AND [3] no complications or SOB    Additional Information    Negative: SEVERE difficulty breathing (e.g., struggling for each breath, speaks in single words)    Negative: Difficult to awaken or acting confused (e.g., disoriented, slurred speech)    Negative: Bluish (or gray) lips or face now    Negative: Shock suspected (e.g., cold/pale/clammy skin, too weak to stand, low BP, rapid pulse)    Negative: Sounds like a life-threatening emergency to the triager    Negative: [1] COVID-19 exposure AND [2] no symptoms    Negative: [1] Lives with someone known to have influenza (flu test positive) AND [2] flu-like symptoms (e.g., cough, runny nose, sore throat, SOB; with or without fever)    Negative: [1] Adult with possible COVID-19 symptoms AND [2] triager concerned about severity of symptoms or other causes    Negative: Immunization reaction suspected (e.g., fever, headache, muscle aches occurring during days 1-3 days after immunization)    Negative: COVID-19 and breastfeeding, questions about    Negative: SEVERE or constant chest pain or pressure  "(Exception: mild central chest pain, present only when coughing)    Negative: MODERATE difficulty breathing (e.g., speaks in phrases, SOB even at rest, pulse 100-120)    Negative: [1] Headache AND [2] stiff neck (can't touch chin to chest)    Negative: MILD difficulty breathing (e.g., minimal/no SOB at rest, SOB with walking, pulse <100)    Negative: Chest pain or pressure    Negative: Patient sounds very sick or weak to the triager    Negative: Fever > 103 F (39.4 C)    Negative: [1] Fever > 101 F (38.3 C) AND [2] age > 60    Negative: [1] Fever > 100.0 F (37.8 C) AND [2] bedridden (e.g., nursing home patient, CVA, chronic illness, recovering from surgery)    Negative: [1] HIGH RISK patient (e.g., age > 64 years, diabetes, heart or lung disease, weak immune system) AND [2] new or worsening symptoms    Negative: [1] HIGH RISK patient AND [2] influenza is widespread in the community AND [3] ONE OR MORE respiratory symptoms: cough, sore throat, runny or stuffy nose    Negative: [1] HIGH RISK patient AND [2] influenza exposure within the last 7 days AND [3] ONE OR MORE respiratory symptoms: cough, sore throat, runny or stuffy nose    Answer Assessment - Initial Assessment Questions  1. COVID-19 DIAGNOSIS: \"Who made your Coronavirus (COVID-19) diagnosis?\" \"Was it confirmed by a positive lab test?\" If not diagnosed by a HCP, ask \"Are there lots of cases (community spread) where you live?\" (See public health department website, if unsure)      At the airport  2. COVID-19 EXPOSURE: \"Was there any known exposure to COVID before the symptoms began?\" CDC Definition of close contact: within 6 feet (2 meters) for a total of 15 minutes or more over a 24-hour period.       tested positive  3. ONSET: \"When did the COVID-19 symptoms start?\"       Monday morning  4. WORST SYMPTOM: \"What is your worst symptom?\" (e.g., cough, fever, shortness of breath, muscle aches)      fatigue and headache  5. COUGH: \"Do you have a cough?\" If so, " "ask: \"How bad is the cough?\"        present but tolerable  6. FEVER: \"Do you have a fever?\" If so, ask: \"What is your temperature, how was it measured, and when did it start?\"      100 or so Monday, tues, wed. Oral digital  7. RESPIRATORY STATUS: \"Describe your breathing?\" (e.g., shortness of breath, wheezing, unable to speak)       Breathing feels heavy  8. BETTER-SAME-WORSE: \"Are you getting better, staying the same or getting worse compared to yesterday?\"  If getting worse, ask, \"In what way?\"      Staying the same   9. HIGH RISK DISEASE: \"Do you have any chronic medical problems?\" (e.g., asthma, heart or lung disease, weak immune system, obesity, etc.)      RA  10. PREGNANCY: \"Is there any chance you are pregnant?\" \"When was your last menstrual period?\"        Not sure, last period was should be getting it any day now  11. OTHER SYMPTOMS: \"Do you have any other symptoms?\"  (e.g., chills, fatigue, headache, loss of smell or taste, muscle pain, sore throat; new loss of smell or taste especially support the diagnosis of COVID-19)        General body aches, h/a, fatigue, sore throat    Protocols used: CORONAVIRUS (COVID-19) DIAGNOSED OR [id number] 12.1  Sailaja Taylor RN   [facility]          "

## 2021-02-15 ENCOUNTER — MYC MEDICAL ADVICE (OUTPATIENT)
Dept: FAMILY MEDICINE | Facility: CLINIC | Age: 46
End: 2021-02-15

## 2021-02-15 NOTE — TELEPHONE ENCOUNTER
Left vm for patient regarding my chart message and providers message. If patient calls back please schedule virtual visit with provider    Domitila Adam RN   [facility]     Sohail Simmons MD  You 8 minutes ago (1:55 PM)     Please set up virtual so I can document controlled medication     Message text

## 2021-02-15 NOTE — TELEPHONE ENCOUNTER
Dr. Simmons,    See patients my chart message. Ok to give Ativan or do you want a virtual visit to discuss? Please advise    Thanks  Domitila Adam RN   [facility]

## 2021-02-18 ENCOUNTER — VIRTUAL VISIT (OUTPATIENT)
Dept: FAMILY MEDICINE | Facility: CLINIC | Age: 46
End: 2021-02-18
Payer: COMMERCIAL

## 2021-02-18 DIAGNOSIS — F41.1 GAD (GENERALIZED ANXIETY DISORDER): ICD-10-CM

## 2021-02-18 PROCEDURE — 99213 OFFICE O/P EST LOW 20 MIN: CPT | Mod: 95 | Performed by: FAMILY MEDICINE

## 2021-02-18 RX ORDER — LORAZEPAM 1 MG/1
1 TABLET ORAL EVERY 8 HOURS PRN
Qty: 10 TABLET | Refills: 0 | Status: SHIPPED | OUTPATIENT
Start: 2021-02-18 | End: 2022-10-14

## 2021-02-18 NOTE — PROGRESS NOTES
Ulises is a 46 year old who is being evaluated via a billable telephone visit.      What phone number would you like to be contacted at? [phone]   How would you like to obtain your AVS? MyChart    Assessment & Plan     SARA (generalized anxiety disorder)  Fear with flying  - LORazepam (ATIVAN) 1 MG tablet; Take 1 tablet (1 mg) by mouth every 8 hours as needed for anxiety             Regular exercise  See Patient Instructions    No follow-ups on file.    Sohail Simmons MD  [facility]    Subjective   Ulises is a 46 year old who presents for the following health issues fear    HPI        Pt will be flying out tomorrow and would like a refill of her Ativan      Review of Systems   Constitutional, HEENT, cardiovascular, pulmonary, gi and gu systems are negative, except as otherwise noted.      Objective           Vitals:  No vitals were obtained today due to virtual visit.    Physical Exam   alert and no distress  PSYCH: Alert and oriented times 3; coherent speech, normal   rate and volume, able to articulate logical thoughts, able   to abstract reason, no tangential thoughts, no hallucinations   or delusions  Her affect is normal  RESP: No cough, no audible wheezing, able to talk in full sentences  Remainder of exam unable to be completed due to telephone visits                Phone call duration: 11 minutes

## 2021-03-18 ENCOUNTER — IMMUNIZATION (OUTPATIENT)
Dept: NURSING | Facility: CLINIC | Age: 46
End: 2021-03-18
Payer: COMMERCIAL

## 2021-03-18 PROCEDURE — 0001A PR COVID VAC PFIZER DIL RECON 30 MCG/0.3 ML IM: CPT

## 2021-03-18 PROCEDURE — 91300 PR COVID VAC PFIZER DIL RECON 30 MCG/0.3 ML IM: CPT

## 2021-03-21 ENCOUNTER — HEALTH MAINTENANCE LETTER (OUTPATIENT)
Age: 46
End: 2021-03-21

## 2021-04-08 ENCOUNTER — IMMUNIZATION (OUTPATIENT)
Dept: NURSING | Facility: CLINIC | Age: 46
End: 2021-04-08
Attending: INTERNAL MEDICINE
Payer: COMMERCIAL

## 2021-04-08 PROCEDURE — 91300 PR COVID VAC PFIZER DIL RECON 30 MCG/0.3 ML IM: CPT

## 2021-04-08 PROCEDURE — 0002A PR COVID VAC PFIZER DIL RECON 30 MCG/0.3 ML IM: CPT

## 2021-05-16 ENCOUNTER — HEALTH MAINTENANCE LETTER (OUTPATIENT)
Age: 46
End: 2021-05-16

## 2021-07-13 ENCOUNTER — TRANSFERRED RECORDS (OUTPATIENT)
Dept: HEALTH INFORMATION MANAGEMENT | Facility: CLINIC | Age: 46
End: 2021-07-13

## 2021-07-13 LAB
ALT SERPL-CCNC: 16 LU/L (ref 5–35)
AST SERPL-CCNC: 20 U/L (ref 5–34)
CREATININE (EXTERNAL): 0.6 MG/DL (ref 0.5–1.3)

## 2021-07-14 ENCOUNTER — MYC MEDICAL ADVICE (OUTPATIENT)
Dept: FAMILY MEDICINE | Facility: CLINIC | Age: 46
End: 2021-07-14

## 2021-07-16 NOTE — TELEPHONE ENCOUNTER
[employer] message sent as well as [facility]    Sailaja Taylor RN   [facility]

## 2021-08-04 ENCOUNTER — MYC MEDICAL ADVICE (OUTPATIENT)
Dept: FAMILY MEDICINE | Facility: CLINIC | Age: 46
End: 2021-08-04

## 2021-08-09 NOTE — TELEPHONE ENCOUNTER
Victor Manuel Calderon  06/07/2006 chart opened and message copied to pt chart, sent to PCP    Sailaja Taylor RN   [facility]

## 2021-09-05 ENCOUNTER — HEALTH MAINTENANCE LETTER (OUTPATIENT)
Age: 46
End: 2021-09-05

## 2021-09-23 ENCOUNTER — OFFICE VISIT (OUTPATIENT)
Dept: FAMILY MEDICINE | Facility: CLINIC | Age: 46
End: 2021-09-23
Payer: COMMERCIAL

## 2021-09-23 VITALS
SYSTOLIC BLOOD PRESSURE: 116 MMHG | OXYGEN SATURATION: 99 % | BODY MASS INDEX: 23.05 KG/M2 | TEMPERATURE: 98.3 F | HEART RATE: 80 BPM | WEIGHT: 135 LBS | DIASTOLIC BLOOD PRESSURE: 82 MMHG | HEIGHT: 64 IN

## 2021-09-23 DIAGNOSIS — M79.622 LEFT AXILLARY PAIN: ICD-10-CM

## 2021-09-23 DIAGNOSIS — L57.0 ACTINIC KERATOSIS: Primary | ICD-10-CM

## 2021-09-23 PROCEDURE — 99214 OFFICE O/P EST MOD 30 MIN: CPT | Performed by: FAMILY MEDICINE

## 2021-09-23 ASSESSMENT — MIFFLIN-ST. JEOR: SCORE: 1242.36

## 2021-09-23 NOTE — PROGRESS NOTES
"    Assessment & Plan     Actinic keratosis  Asymptomatic benign lesions can be observed for changes or symptoms over time. Symptomatic lesions can be treated if she desires; to be scheduled at a later date. Sun protection with sunscreens and clothing to prevent skin cancer is discussed. The signs and symptoms of malignant skin lesions are reviewed with her today.    Left axillary pain  Very tender lymph nodes , Follow up only if unimproved.                Regular exercise  See Patient Instructions    No follow-ups on file.    Sohail Simmons MD  [facility] is a 46 year old who presents for the following health issues     HPI     Skin Evaluation  Onset/Duration: 6 months  Description  Location: Right side, face  Character: raised, red  Itching: moderate  Intensity:  moderate  Progression of Symptoms:  same  Accompanying signs and symptoms:   Fever: no  Body aches or joint pain: no  Sore throat symptoms: no  Recent cold symptoms: no  History:           Previous episodes of similar rash: None  New exposures:  None  Recent travel: no  Exposure to similar rash: no  Precipitating or alleviating factors: none  Therapies tried and outcome: none        Review of Systems   Constitutional, HEENT, cardiovascular, pulmonary, gi and gu systems are negative, except as otherwise noted.      Objective    /82   Pulse 80   Temp 98.3  F (36.8  C) (Temporal)   Ht 1.626 m (5' 4\")   Wt 61.2 kg (135 lb)   SpO2 99%   BMI 23.17 kg/m    Body mass index is 23.17 kg/m .  Physical Exam   GENERAL: healthy, alert and no distress  EYES: Eyes grossly normal to inspection, PERRL and conjunctivae and sclerae normal  HENT: ear canals and TM's normal, nose and mouth without ulcers or lesions  NECK: no adenopathy, no asymmetry, masses, or scars and thyroid normal to palpation  RESP: lungs clear to auscultation - no rales, rhonchi or wheezes  CV: regular rate and rhythm, normal S1 S2, no " S3 or S4, no murmur, click or rub, no peripheral edema and peripheral pulses strong  ABDOMEN: soft, nontender, no hepatosplenomegaly, no masses and bowel sounds normal  SKIN: keratoses - actinic # on face/ arms  NEURO: Normal strength and tone, mentation intact and speech normal  LYMPH: normal ant/post cervical, supraclavicular nodes  axillary: enlarged tender nodes    Transferred Records on 07/13/2021   Component Date Value Ref Range Status     ALT (External) 07/13/2021 16  5 - 35 Miguel A/L Final     AST (External) 07/13/2021 20  5 - 34 U/L Final     Creatinine (External) 07/13/2021 0.600  0.500 - 1.300 mg/dL Final

## 2021-10-19 ENCOUNTER — TRANSFERRED RECORDS (OUTPATIENT)
Dept: HEALTH INFORMATION MANAGEMENT | Facility: CLINIC | Age: 46
End: 2021-10-19

## 2021-10-19 LAB
ALT SERPL-CCNC: 15 LU/L (ref 5–35)
AST SERPL-CCNC: 19 U/L (ref 5–34)
CREATININE (EXTERNAL): 0.51 MG/DL (ref 0.5–1.3)

## 2021-10-24 ENCOUNTER — MEDICAL CORRESPONDENCE (OUTPATIENT)
Dept: HEALTH INFORMATION MANAGEMENT | Facility: CLINIC | Age: 46
End: 2021-10-24
Payer: COMMERCIAL

## 2021-10-28 ENCOUNTER — LAB (OUTPATIENT)
Dept: LAB | Facility: CLINIC | Age: 46
End: 2021-10-28
Payer: COMMERCIAL

## 2021-10-28 DIAGNOSIS — M05.79 RHEUMATOID ARTHRITIS WITH RHEUMATOID FACTOR OF MULTIPLE SITES WITHOUT ORGAN OR SYSTEMS INVOLVEMENT (H): Primary | ICD-10-CM

## 2021-10-28 PROCEDURE — 80061 LIPID PANEL: CPT

## 2021-10-28 PROCEDURE — 36415 COLL VENOUS BLD VENIPUNCTURE: CPT

## 2021-10-29 LAB
CHOLEST SERPL-MCNC: 195 MG/DL
FASTING STATUS PATIENT QL REPORTED: YES
HDLC SERPL-MCNC: 64 MG/DL
LDLC SERPL CALC-MCNC: 121 MG/DL
NONHDLC SERPL-MCNC: 131 MG/DL
TRIGL SERPL-MCNC: 52 MG/DL

## 2022-01-17 ENCOUNTER — TRANSFERRED RECORDS (OUTPATIENT)
Dept: HEALTH INFORMATION MANAGEMENT | Facility: CLINIC | Age: 47
End: 2022-01-17
Payer: COMMERCIAL

## 2022-01-17 LAB
ALT SERPL-CCNC: 16 IU/L (ref 5–35)
AST SERPL-CCNC: 21 U/L (ref 5–34)
CREATININE (EXTERNAL): 0.64 MG/DL (ref 0.5–1.3)

## 2022-02-08 ENCOUNTER — E-VISIT (OUTPATIENT)
Dept: FAMILY MEDICINE | Facility: CLINIC | Age: 47
End: 2022-02-08
Payer: COMMERCIAL

## 2022-02-08 ENCOUNTER — MYC MEDICAL ADVICE (OUTPATIENT)
Dept: FAMILY MEDICINE | Facility: CLINIC | Age: 47
End: 2022-02-08
Payer: COMMERCIAL

## 2022-02-08 DIAGNOSIS — F40.243 FEAR OF FLYING: Primary | ICD-10-CM

## 2022-02-08 PROCEDURE — 99422 OL DIG E/M SVC 11-20 MIN: CPT | Performed by: FAMILY MEDICINE

## 2022-02-08 RX ORDER — LORAZEPAM 1 MG/1
1 TABLET ORAL EVERY 6 HOURS PRN
Qty: 8 TABLET | Refills: 0 | Status: SHIPPED | OUTPATIENT
Start: 2022-02-08 | End: 2022-10-14

## 2022-02-08 NOTE — TELEPHONE ENCOUNTER
Response sent to patient via [employer].     Michael Villalba RN   [facility]

## 2022-02-08 NOTE — PATIENT INSTRUCTIONS
Thank you for choosing us for your care. I have placed an order for a prescription so that you can start treatment. View your full visit summary for details by clicking on the link below. Your pharmacist will able to address any questions you may have about the medication.     If you're not feeling better within 5-7 days, please schedule an appointment.  You can schedule an appointment right here in [facility], or call [phone]  If the visit is for the same symptoms as your eVisit, we'll refund the cost of your eVisit if seen within seven days.

## 2022-02-08 NOTE — TELEPHONE ENCOUNTER
Encounter Diagnosis   Name Primary?     Fear of flying Yes      Orders Placed This Encounter     LORazepam (ATIVAN) 1 MG tablet     Sig: Take 1 tablet (1 mg) by mouth every 6 hours as needed for anxiety     Dispense:  8 tablet     Refill:  0     For fear of flying     Provider E-Visit time total (minutes): 11

## 2022-02-17 PROBLEM — M06.9 RHEUMATOID ARTHRITIS INVOLVING MULTIPLE SITES (H): Status: ACTIVE | Noted: 2018-06-05

## 2022-03-29 ENCOUNTER — MYC MEDICAL ADVICE (OUTPATIENT)
Dept: FAMILY MEDICINE | Facility: CLINIC | Age: 47
End: 2022-03-29
Payer: COMMERCIAL

## 2022-04-17 ENCOUNTER — HEALTH MAINTENANCE LETTER (OUTPATIENT)
Age: 47
End: 2022-04-17

## 2022-06-12 ENCOUNTER — HEALTH MAINTENANCE LETTER (OUTPATIENT)
Age: 47
End: 2022-06-12

## 2022-07-14 ENCOUNTER — VIRTUAL VISIT (OUTPATIENT)
Dept: FAMILY MEDICINE | Facility: CLINIC | Age: 47
End: 2022-07-14
Payer: COMMERCIAL

## 2022-07-14 DIAGNOSIS — M54.12 CERVICAL RADICULOPATHY: Primary | ICD-10-CM

## 2022-07-14 PROCEDURE — 99214 OFFICE O/P EST MOD 30 MIN: CPT | Mod: 95 | Performed by: FAMILY MEDICINE

## 2022-07-14 RX ORDER — CYCLOBENZAPRINE HCL 10 MG
10 TABLET ORAL 3 TIMES DAILY PRN
Qty: 30 TABLET | Refills: 0 | Status: SHIPPED | OUTPATIENT
Start: 2022-07-14 | End: 2022-10-14

## 2022-07-14 RX ORDER — PREDNISONE 10 MG/1
TABLET ORAL
Qty: 12 TABLET | Refills: 0 | Status: SHIPPED | OUTPATIENT
Start: 2022-07-18 | End: 2022-07-26

## 2022-07-14 NOTE — PROGRESS NOTES
Ulises is a 47 year old who is being evaluated via a billable video visit.      How would you like to obtain your AVS? MyChart  If the video visit is dropped, the invitation should be resent by: Text to cell phone: [phone]  Will anyone else be joining your video visit? No          Assessment & Plan     Cervical radiculopathy  Ice./ rest/ call if worse  - cyclobenzaprine (FLEXERIL) 10 MG tablet  Dispense: 30 tablet; Refill: 0  - predniSONE (DELTASONE) 10 MG tablet  Dispense: 12 tablet; Refill: 0     Consider Physical Therapy and XRay studies if not improving.   Call or return to clinic prn if these symptoms worsen or fail to improve as anticipated.             Ice/ rest  See Patient Instructions    No follow-ups on file.    Sohail Simmons MD  [facility]    Subjective   Ulises is a 47 year old, presenting for the following health issues:  Chief Complaint   Patient presents with     Pain     Pt c/o pinched nerve in neck       HPI        SUBJECTIVE:    neck pain sincee a few  days ago. The pain is positional with movement of neck with radiation of pain down the left arm . Mechanism of injury: none.  Symptoms have been waxing and waning since that time. Prior history of neck problems: no prior neck problems. There is  some Tingling withpout numbness the arm          Review of Systems   Constitutional, HEENT, cardiovascular, pulmonary, gi and gu systems are negative, except as otherwise noted.      Objective           Vitals:  No vitals were obtained today due to virtual visit.    Physical Exam   GENERAL: alert and mild distress  EYES: Eyes grossly normal to inspection.  No discharge or erythema, or obvious scleral/conjunctival abnormalities.  RESP: No audible wheeze, cough, or visible cyanosis.  No visible retractions or increased work of breathing.    SKIN: Visible skin clear. No significant rash, abnormal pigmentation or lesions.  NEURO: Cranial nerves grossly intact.  Mentation and  speech appropriate for age.  PSYCH: Mentation appears normal, affect normal/bright, judgement and insight intact, normal speech and appearance well-groomed.    Transferred Records on 01/17/2022   Component Date Value Ref Range Status     ALT (External) 01/17/2022 16  5 - 35 IU/L Final     AST (External) 01/17/2022 21  5 - 34 U/L Final     Creatinine (External) 01/17/2022 0.640  0.500 - 1.300 mg/dL Final               Video-Visit Details    Video Start Time: 505    Type of service:  Video Visit    Video End Time:520    Originating Location (pt. Location): Home    Distant Location (provider location):  [facility]     Platform used for Video Visit: Try[employer]    .  Thor.

## 2022-08-14 ENCOUNTER — E-VISIT (OUTPATIENT)
Dept: FAMILY MEDICINE | Facility: CLINIC | Age: 47
End: 2022-08-14
Payer: COMMERCIAL

## 2022-08-14 DIAGNOSIS — N92.6 IRREGULAR MENSES: Primary | ICD-10-CM

## 2022-08-14 PROCEDURE — 99207 PR NON-BILLABLE SERV PER CHARTING: CPT | Performed by: FAMILY MEDICINE

## 2022-08-14 NOTE — PATIENT INSTRUCTIONS
Thank you for choosing us for your care. I think an in-clinic visit would be best next steps based on your symptoms. Please schedule a clinic appointment; you won t be charged for this eVisit.      You can schedule an appointment right here in [facility], or call [phone]

## 2022-08-30 ENCOUNTER — TRANSFERRED RECORDS (OUTPATIENT)
Dept: FAMILY MEDICINE | Facility: CLINIC | Age: 47
End: 2022-08-30

## 2022-08-30 LAB
ALT SERPL-CCNC: 17 IU/L (ref 5–35)
AST SERPL-CCNC: 27 U/L (ref 5–34)
CREATININE (EXTERNAL): 0.59 MG/DL (ref 0.5–1.3)
GFR ESTIMATED (EXTERNAL): 116.6 ML/MIN/1.73M2

## 2022-10-07 ENCOUNTER — MYC MEDICAL ADVICE (OUTPATIENT)
Dept: FAMILY MEDICINE | Facility: CLINIC | Age: 47
End: 2022-10-07

## 2022-10-07 DIAGNOSIS — N93.9 ABNORMAL UTERINE BLEEDING: ICD-10-CM

## 2022-10-07 DIAGNOSIS — N93.8 DUB (DYSFUNCTIONAL UTERINE BLEEDING): Primary | ICD-10-CM

## 2022-10-10 NOTE — TELEPHONE ENCOUNTER
Spoke with OB/Gyn  Azcuena will have a provider review issue and set up appointment    Sailaja Taylor RN   [facility]

## 2022-10-10 NOTE — TELEPHONE ENCOUNTER
Per Dr. Simmons patient should be seen ASAP for DUB. Is there anyone that could get her in ASAP? Looks like she has seen SL before

## 2022-10-10 NOTE — TELEPHONE ENCOUNTER
I did not know that she was bleeding again, she need to see gyn in the next 2 days. Please check with the 7th floor folks and beg her in

## 2022-10-10 NOTE — TELEPHONE ENCOUNTER
Dr Simmons    See pt Mychart response  She has an appointment set for 10/14 at 3p in clinic  Do you want to see her at the 1030a per her message?    Sailaja Taylor RN   [facility]

## 2022-10-10 NOTE — TELEPHONE ENCOUNTER
Ulises Butler is a 47 year old  with abnormal uterine bleeding. Seen in ED  for 17d of heavy bleeding.  Recommend she get scheduled for an ultrasound and follow up appointment this week if possible, I ordered the ultrasound. Hgb on  was 13.5. Last time I saw patient was 2018.Can be scheduled with any MD. You could add her onto my sched Fri but please tell her I'm on call and so appt may be rescheduled or converted to phone visit. Please call patient to help her get scheduled.     Jackie Christiansen MD

## 2022-10-11 ENCOUNTER — MYC MEDICAL ADVICE (OUTPATIENT)
Dept: OBGYN | Facility: CLINIC | Age: 47
End: 2022-10-11

## 2022-10-11 DIAGNOSIS — N93.9 ABNORMAL UTERINE BLEEDING (AUB): Primary | ICD-10-CM

## 2022-10-11 NOTE — TELEPHONE ENCOUNTER
"Ultrasound report was not obvious on initial chart review -  Review of care everywhere ED note shows the following report (embedded within ED encounter not under \"other results\" with other imaging).      09/30/2022 6:38 PM CDT    This document is currently in Final Status    Exam Accession# [id number]    US PELVIS WITH ENDOVAG NON OB    INDICATION: Persistent vaginal bleeding, lower pelvic cramping.    COMPARISON: None.    FINDINGS:  Uterus: Normal. Uterus measures: 4.9 cm x 5.5 cm.  Endometrium: Poorly defined endometrial-myometrial interface, which can be seen with adenomyosis.  Right ovary: Normal. Ovarian blood flow present. Volume: 4 mL.  Left ovary: Normal. Ovarian blood flow present. Volume: 7 mL.  Free fluid: No free fluid in the left adnexa adjacent to the left ovary.    IMPRESSION:  1.  Poorly defined endometrial-myometrial interface, which can be seen with adenomyosis. Endometrial thickening cannot be excluded.  2.  Free fluid in the left adnexa.  3.  Unremarkable bilateral ovaries.    Dictated By: Dr. Luther Bowman 9/30/2022 6:21 PM  Edited By: HARJEET 9/30/2022 6:26 PM    Electronically Signed: Dr. Luther Bowman 9/30/2022 6:38 PM        Hgb 13.5 / hematocrit 41.4 / plts 248      Recommend phone or clinic visit with JACINTO TURCIOS. She does not need another ultrasound at this time. Please help her get scheduled.    Jackie Christiansen MD       "

## 2022-10-12 RX ORDER — NORGESTIMATE AND ETHINYL ESTRADIOL 0.25-0.035
KIT ORAL
Qty: 28 TABLET | Refills: 1 | Status: SHIPPED | OUTPATIENT
Start: 2022-10-12 | End: 2022-11-07

## 2022-10-12 NOTE — TELEPHONE ENCOUNTER
Route to provider: prescription refill?     Ulises is scheduled to see  10/14 for follow up on heavy bleeding    Presented to ED in [place] 9/30 for heavy bleeding x17 days with clots.   Imaging performed showed   IMPRESSION:  1.  Poorly defined endometrial-myometrial interface, which can be seen with adenomyosis. Endometrial thickening cannot be excluded.  2.  Free fluid in the left adnexa.  3.  Unremarkable bilateral ovaries.    From ED note  Birth control twice a day until bleeding has stopped. Follow-up with gynecologic provider. Return to ER if symptoms worsen.    Patient took double dose ocps 10/7-10/11; bleeding stopped    10/14 bleeding started again, restarted double dose ocps  Continues to have heavy bleeding, changing super tampon q 90 minutes    Send refill or try something else to manage bleeding until her office visit?

## 2022-10-12 NOTE — TELEPHONE ENCOUNTER
Per patient report she stopped the OCP on the 11th.  Notes bleeding restarted on Friday the 14th, but today is 10/12/22.  Assuming she started bleeding again today we can repeat OCP taper starting with 3 tablets of birth control today (am, noon, and pm).  She will take 3 tablets for 3 days, 2 tablets for 2 days, and then daily until the pack is done.  This will get her past her visit with Dr. Christiansen on 10/14/22.  I'll send in a new prescription.  Thanks,  Yanci

## 2022-10-14 ENCOUNTER — OFFICE VISIT (OUTPATIENT)
Dept: OBGYN | Facility: CLINIC | Age: 47
End: 2022-10-14
Payer: COMMERCIAL

## 2022-10-14 VITALS — SYSTOLIC BLOOD PRESSURE: 125 MMHG | DIASTOLIC BLOOD PRESSURE: 71 MMHG | OXYGEN SATURATION: 100 % | HEART RATE: 74 BPM

## 2022-10-14 DIAGNOSIS — N80.03 ADENOMYOSIS: ICD-10-CM

## 2022-10-14 DIAGNOSIS — N93.9 ABNORMAL UTERINE BLEEDING (AUB): Primary | ICD-10-CM

## 2022-10-14 LAB
ERYTHROCYTE [DISTWIDTH] IN BLOOD BY AUTOMATED COUNT: 12.3 % (ref 10–15)
HCT VFR BLD AUTO: 40.1 % (ref 35–47)
HGB BLD-MCNC: 13.2 G/DL (ref 11.7–15.7)
IRON SATN MFR SERPL: 24 % (ref 15–46)
IRON SERPL-MCNC: 90 UG/DL (ref 35–180)
MCH RBC QN AUTO: 30.8 PG (ref 26.5–33)
MCHC RBC AUTO-ENTMCNC: 32.9 G/DL (ref 31.5–36.5)
MCV RBC AUTO: 94 FL (ref 78–100)
PLATELET # BLD AUTO: 244 10E3/UL (ref 150–450)
RBC # BLD AUTO: 4.28 10E6/UL (ref 3.8–5.2)
TIBC SERPL-MCNC: 371 UG/DL (ref 240–430)
WBC # BLD AUTO: 5.8 10E3/UL (ref 4–11)

## 2022-10-14 PROCEDURE — 85027 COMPLETE CBC AUTOMATED: CPT | Performed by: OBSTETRICS & GYNECOLOGY

## 2022-10-14 PROCEDURE — 99204 OFFICE O/P NEW MOD 45 MIN: CPT | Performed by: OBSTETRICS & GYNECOLOGY

## 2022-10-14 PROCEDURE — 83550 IRON BINDING TEST: CPT | Performed by: OBSTETRICS & GYNECOLOGY

## 2022-10-14 PROCEDURE — 84443 ASSAY THYROID STIM HORMONE: CPT | Performed by: OBSTETRICS & GYNECOLOGY

## 2022-10-14 PROCEDURE — 36415 COLL VENOUS BLD VENIPUNCTURE: CPT | Performed by: OBSTETRICS & GYNECOLOGY

## 2022-10-14 PROCEDURE — 82728 ASSAY OF FERRITIN: CPT | Performed by: OBSTETRICS & GYNECOLOGY

## 2022-10-14 PROCEDURE — 83540 ASSAY OF IRON: CPT | Performed by: OBSTETRICS & GYNECOLOGY

## 2022-10-14 RX ORDER — NORGESTIMATE AND ETHINYL ESTRADIOL 0.25-0.035
1 KIT ORAL 2 TIMES DAILY
Qty: 84 TABLET | Refills: 3 | Status: ON HOLD | OUTPATIENT
Start: 2022-10-14 | End: 2022-11-30
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
## 2022-10-14 NOTE — Clinical Note
When you call Ulises to schedule hyst, can you also schedule her with me for an endometrial biopsy at least 2 weeks prior to surgery? Thanks Jackie Chirstiansen MD

## 2022-10-16 LAB
FERRITIN SERPL-MCNC: 13 NG/ML (ref 8–252)
TSH SERPL DL<=0.005 MIU/L-ACNC: 1.68 MU/L (ref 0.4–4)

## 2022-10-20 ENCOUNTER — TELEPHONE (OUTPATIENT)
Dept: OBGYN | Facility: CLINIC | Age: 47
End: 2022-10-20

## 2022-10-20 NOTE — TELEPHONE ENCOUNTER
Attempted to call patient, rang and went to ,  full so unable to leave message. Zuri message sent.     Belinda  She/her/hers  [facility]

## 2022-10-20 NOTE — TELEPHONE ENCOUNTER
LVMTCB to update patient on scheduling: no opening with OR in Nov/Dec 2022, text sent to SL asking for other scheduling options/avenues to explore to see if patient can get in before end of year. Expecting to have response tomorrow 10/21/22 and will call patient back with nazia Palencia/her/hers  [facility]

## 2022-10-21 NOTE — TELEPHONE ENCOUNTER
SL unable to perform on F 12/23/22 (surgical block day) as she will be post-call, but stated that if either KD or YANG were interested they could take it on as they are both working in clinic 12/23/22.     Spoke with KD in clinic, OK to perform with YANG as assist. Would schedule EMB appt with KD >2 wks prior to DOS.     Called patient back, informed that only option before end of year would be F 12/23/22 at 7:30AM but not with SL. Patient needs to discuss with  and will call or MyChart back when/if she is ready to schedule.     Belinda  She/her/hers  [facility]

## 2022-10-23 ENCOUNTER — HEALTH MAINTENANCE LETTER (OUTPATIENT)
Age: 47
End: 2022-10-23

## 2022-10-25 NOTE — TELEPHONE ENCOUNTER
Type of surgery: gyn  Location of surgery: [facility]  Date and time of surgery: 11/30/22 11AM  Surgeon: COOPER Christiansen assisting  Pre-Op Appt Date: 11/15/22 Sohail Simmons  Post-Op Appt Date: 12/16/22 HP, patient will wait to schedule 6 wk post op  Packet sent out: Yes  Pre-cert/Authorization completed:  Not Applicable  Date: 10/25/22     MOISÉS Trevino  She/her/hers  [facility]

## 2022-11-01 RX ORDER — PHENAZOPYRIDINE HYDROCHLORIDE 200 MG/1
200 TABLET, FILM COATED ORAL ONCE
Status: CANCELLED | OUTPATIENT
Start: 2022-11-30

## 2022-11-01 RX ORDER — ACETAMINOPHEN 325 MG/1
975 TABLET ORAL ONCE
Status: CANCELLED | OUTPATIENT
Start: 2022-11-30

## 2022-11-01 RX ORDER — CEFAZOLIN SODIUM/WATER 2 G/20 ML
2 SYRINGE (ML) INTRAVENOUS SEE ADMIN INSTRUCTIONS
Status: CANCELLED | OUTPATIENT
Start: 2022-11-30

## 2022-11-01 RX ORDER — CEFAZOLIN SODIUM/WATER 2 G/20 ML
2 SYRINGE (ML) INTRAVENOUS
Status: CANCELLED | OUTPATIENT
Start: 2022-11-30

## 2022-11-03 ENCOUNTER — OFFICE VISIT (OUTPATIENT)
Dept: OBGYN | Facility: CLINIC | Age: 47
End: 2022-11-03
Payer: COMMERCIAL

## 2022-11-03 VITALS
HEIGHT: 64 IN | WEIGHT: 140 LBS | HEART RATE: 77 BPM | OXYGEN SATURATION: 98 % | DIASTOLIC BLOOD PRESSURE: 79 MMHG | SYSTOLIC BLOOD PRESSURE: 120 MMHG | BODY MASS INDEX: 23.9 KG/M2

## 2022-11-03 DIAGNOSIS — N93.9 ABNORMAL UTERINE BLEEDING: Primary | ICD-10-CM

## 2022-11-03 DIAGNOSIS — F41.8 SITUATIONAL ANXIETY: ICD-10-CM

## 2022-11-03 DIAGNOSIS — M06.9 RHEUMATOID ARTHRITIS INVOLVING MULTIPLE SITES, UNSPECIFIED WHETHER RHEUMATOID FACTOR PRESENT (H): ICD-10-CM

## 2022-11-03 DIAGNOSIS — Z32.00 PREGNANCY EXAMINATION OR TEST, PREGNANCY UNCONFIRMED: ICD-10-CM

## 2022-11-03 DIAGNOSIS — N80.03 ADENOMYOSIS: ICD-10-CM

## 2022-11-03 PROCEDURE — 58100 BIOPSY OF UTERUS LINING: CPT | Performed by: OBSTETRICS & GYNECOLOGY

## 2022-11-03 PROCEDURE — 88305 TISSUE EXAM BY PATHOLOGIST: CPT | Performed by: PATHOLOGY

## 2022-11-03 PROCEDURE — 99213 OFFICE O/P EST LOW 20 MIN: CPT | Mod: 25 | Performed by: OBSTETRICS & GYNECOLOGY

## 2022-11-03 RX ORDER — LORAZEPAM 1 MG/1
1 TABLET ORAL EVERY 8 HOURS PRN
Qty: 12 TABLET | Refills: 0 | Status: ON HOLD | OUTPATIENT
Start: 2022-11-03 | End: 2022-11-30

## 2022-11-03 NOTE — Clinical Note
I will try to contact her rheumatologist but wondering if Ulises should take a break of her Actmra around the time of surgery or if you think it would be safe for her to continue? You are seeing her next week for a preop physical. Thanks Jackie Christiansen MD

## 2022-11-03 NOTE — PATIENT INSTRUCTIONS
Endometrial Biopsy  Post-Procedure Patient Instructions      Please monitor for any of the following:      Fever   Cramping after 48 hours   Bleeding for 24-48 hours that is heavier than a normal period   Any bleeding that soaks more than 1 pad per hour      Please call your health care provider if you develop any of the above symptoms or problems.     [facility]   Nurse Triage Line [phone]      Use pads, not tampons, for the bleeding. You may resume sexual relations in 2-3 days after bleeding has stopped.
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
## 2022-11-07 ENCOUNTER — TELEPHONE (OUTPATIENT)
Dept: OBGYN | Facility: CLINIC | Age: 47
End: 2022-11-07

## 2022-11-07 LAB
PATH REPORT.COMMENTS IMP SPEC: NORMAL
PATH REPORT.COMMENTS IMP SPEC: NORMAL
PATH REPORT.FINAL DX SPEC: NORMAL
PATH REPORT.GROSS SPEC: NORMAL
PATH REPORT.MICROSCOPIC SPEC OTHER STN: NORMAL
PATH REPORT.RELEVANT HX SPEC: NORMAL
PHOTO IMAGE: NORMAL

## 2022-11-07 NOTE — TELEPHONE ENCOUNTER
Ulises Butler is a 47 year old  with rheumatoid arthritis who is scheduled for a total laparoscopic hysterectomy bilateral salpingectomy on  for abnormal uterine bleeding due to suspected adenomyosis.    I called her rheumatologist Dr. Sangeetha Morley at [facility] to discuss if she should continue or take a break from Actemra around the time of surgery. I left a message with her nurse and she will give us a call back.     Jackie Christiansen MD 2022 1:42 PM       ADDENDUM  Talked with Dr. Sangeetha Morley who recommends her last dose should be 1 week before surgery, then wait 2 weeks before restarting. Her clinic will call Ulises to figure out which days she takes the med and what her schedule should be.     Jackie Christiansen MD

## 2022-11-15 ENCOUNTER — OFFICE VISIT (OUTPATIENT)
Dept: FAMILY MEDICINE | Facility: CLINIC | Age: 47
End: 2022-11-15
Payer: COMMERCIAL

## 2022-11-15 VITALS
TEMPERATURE: 98.9 F | WEIGHT: 149 LBS | BODY MASS INDEX: 26.4 KG/M2 | HEART RATE: 68 BPM | SYSTOLIC BLOOD PRESSURE: 153 MMHG | RESPIRATION RATE: 8 BRPM | DIASTOLIC BLOOD PRESSURE: 81 MMHG | HEIGHT: 63 IN | OXYGEN SATURATION: 100 %

## 2022-11-15 DIAGNOSIS — N93.8 DUB (DYSFUNCTIONAL UTERINE BLEEDING): ICD-10-CM

## 2022-11-15 DIAGNOSIS — Z01.818 PREOP GENERAL PHYSICAL EXAM: Primary | ICD-10-CM

## 2022-11-15 DIAGNOSIS — M06.9 RHEUMATOID ARTHRITIS INVOLVING MULTIPLE SITES, UNSPECIFIED WHETHER RHEUMATOID FACTOR PRESENT (H): ICD-10-CM

## 2022-11-15 LAB
ERYTHROCYTE [DISTWIDTH] IN BLOOD BY AUTOMATED COUNT: 12 % (ref 10–15)
HCT VFR BLD AUTO: 40.6 % (ref 35–47)
HGB BLD-MCNC: 13.6 G/DL (ref 11.7–15.7)
MCH RBC QN AUTO: 31.3 PG (ref 26.5–33)
MCHC RBC AUTO-ENTMCNC: 33.5 G/DL (ref 31.5–36.5)
MCV RBC AUTO: 94 FL (ref 78–100)
PLATELET # BLD AUTO: 210 10E3/UL (ref 150–450)
RBC # BLD AUTO: 4.34 10E6/UL (ref 3.8–5.2)
WBC # BLD AUTO: 4.9 10E3/UL (ref 4–11)

## 2022-11-15 PROCEDURE — 85027 COMPLETE CBC AUTOMATED: CPT | Performed by: FAMILY MEDICINE

## 2022-11-15 PROCEDURE — 93000 ELECTROCARDIOGRAM COMPLETE: CPT | Performed by: FAMILY MEDICINE

## 2022-11-15 PROCEDURE — 36415 COLL VENOUS BLD VENIPUNCTURE: CPT | Performed by: FAMILY MEDICINE

## 2022-11-15 PROCEDURE — 99214 OFFICE O/P EST MOD 30 MIN: CPT | Performed by: FAMILY MEDICINE

## 2022-11-15 ASSESSMENT — PAIN SCALES - GENERAL: PAINLEVEL: NO PAIN (0)
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
## 2022-11-21 ENCOUNTER — IMMUNIZATION (OUTPATIENT)
Dept: FAMILY MEDICINE | Facility: CLINIC | Age: 47
End: 2022-11-21
Payer: COMMERCIAL

## 2022-11-21 DIAGNOSIS — Z23 NEED FOR COVID-19 VACCINE: Primary | ICD-10-CM

## 2022-11-21 DIAGNOSIS — Z23 ENCOUNTER FOR IMMUNIZATION: ICD-10-CM

## 2022-11-21 PROCEDURE — 91312 COVID-19,PF,PFIZER BOOSTER BIVALENT: CPT

## 2022-11-21 PROCEDURE — 90686 IIV4 VACC NO PRSV 0.5 ML IM: CPT

## 2022-11-21 PROCEDURE — 0124A COVID-19,PF,PFIZER BOOSTER BIVALENT: CPT

## 2022-11-21 PROCEDURE — 90471 IMMUNIZATION ADMIN: CPT

## 2022-11-21 PROCEDURE — 99207 PR NO CHARGE NURSE ONLY: CPT

## 2022-11-21 NOTE — PROGRESS NOTES
Prior to immunization administration, verified patients identity using patient s name and date of birth. Please see Immunization Activity for additional information.     Screening Questionnaire for Adult Immunization    Are you sick today?   No   Do you have allergies to medications, food, a vaccine component or latex?   Yes   Have you ever had a serious reaction after receiving a vaccination?   No   Do you have a long-term health problem with heart, lung, kidney, or metabolic disease (e.g., diabetes), asthma, a blood disorder, no spleen, complement component deficiency, a cochlear implant, or a spinal fluid leak?  Are you on long-term aspirin therapy?   No   Do you have cancer, leukemia, HIV/AIDS, or any other immune system problem?   Yes   Do you have a parent, brother, or sister with an immune system problem?   No   In the past 3 months, have you taken medications that affect  your immune system, such as prednisone, other steroids, or anticancer drugs; drugs for the treatment of rheumatoid arthritis, Crohn s disease, or psoriasis; or have you had radiation treatments?   Yes   Have you had a seizure, or a brain or other nervous system problem?   No   During the past year, have you received a transfusion of blood or blood    products, or been given immune (gamma) globulin or antiviral drug?   No   For women: Are you pregnant or is there a chance you could become       pregnant during the next month?   No   Have you received any vaccinations in the past 4 weeks?   No     Immunization questionnaire was positive for at least one answer.  Notified Dr Simmons.        Per orders of Dr. Simmons, injection of Pfizer Bivalent and Flu Shot given by Sole Dodd MA. Patient instructed to remain in clinic for 15 minutes afterwards, and to report any adverse reaction to me immediately.       Screening performed by Sole Dodd MA on 11/21/2022 at 2:56 PM.

## 2022-11-23 DIAGNOSIS — Z01.818 PRE-OP EXAM: Primary | ICD-10-CM

## 2022-11-23 DIAGNOSIS — N93.9 ABNORMAL UTERINE BLEEDING: ICD-10-CM

## 2022-11-27 LAB
ABO/RH(D): NORMAL
ANTIBODY SCREEN: NEGATIVE
SPECIMEN EXPIRATION DATE: NORMAL
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
## 2022-11-28 ENCOUNTER — LAB (OUTPATIENT)
Dept: LAB | Facility: CLINIC | Age: 47
End: 2022-11-28
Payer: COMMERCIAL

## 2022-11-28 DIAGNOSIS — Z01.818 PRE-OP EXAM: ICD-10-CM

## 2022-11-28 DIAGNOSIS — N93.9 ABNORMAL UTERINE BLEEDING: ICD-10-CM

## 2022-11-28 LAB
ERYTHROCYTE [DISTWIDTH] IN BLOOD BY AUTOMATED COUNT: 12.3 % (ref 10–15)
HCT VFR BLD AUTO: 39.8 % (ref 35–47)
HGB BLD-MCNC: 13.3 G/DL (ref 11.7–15.7)
MCH RBC QN AUTO: 31 PG (ref 26.5–33)
MCHC RBC AUTO-ENTMCNC: 33.4 G/DL (ref 31.5–36.5)
MCV RBC AUTO: 93 FL (ref 78–100)
PLATELET # BLD AUTO: 236 10E3/UL (ref 150–450)
RBC # BLD AUTO: 4.29 10E6/UL (ref 3.8–5.2)
SARS-COV-2 RNA RESP QL NAA+PROBE: NEGATIVE
WBC # BLD AUTO: 5.2 10E3/UL (ref 4–11)

## 2022-11-28 PROCEDURE — 85027 COMPLETE CBC AUTOMATED: CPT

## 2022-11-28 PROCEDURE — 86900 BLOOD TYPING SEROLOGIC ABO: CPT

## 2022-11-28 PROCEDURE — U0005 INFEC AGEN DETEC AMPLI PROBE: HCPCS

## 2022-11-28 PROCEDURE — 86901 BLOOD TYPING SEROLOGIC RH(D): CPT

## 2022-11-28 PROCEDURE — 36415 COLL VENOUS BLD VENIPUNCTURE: CPT

## 2022-11-28 PROCEDURE — U0003 INFECTIOUS AGENT DETECTION BY NUCLEIC ACID (DNA OR RNA); SEVERE ACUTE RESPIRATORY SYNDROME CORONAVIRUS 2 (SARS-COV-2) (CORONAVIRUS DISEASE [COVID-19]), AMPLIFIED PROBE TECHNIQUE, MAKING USE OF HIGH THROUGHPUT TECHNOLOGIES AS DESCRIBED BY CMS-2020-01-R: HCPCS

## 2022-11-28 PROCEDURE — 86850 RBC ANTIBODY SCREEN: CPT

## 2022-11-30 ENCOUNTER — ANESTHESIA EVENT (OUTPATIENT)
Dept: SURGERY | Facility: CLINIC | Age: 47
End: 2022-11-30
Payer: COMMERCIAL

## 2022-11-30 ENCOUNTER — HOSPITAL ENCOUNTER (OUTPATIENT)
Facility: CLINIC | Age: 47
Discharge: HOME | End: 2022-11-30
Attending: OBSTETRICS & GYNECOLOGY | Admitting: OBSTETRICS & GYNECOLOGY
Payer: COMMERCIAL

## 2022-11-30 ENCOUNTER — ANESTHESIA (OUTPATIENT)
Dept: SURGERY | Facility: CLINIC | Age: 47
End: 2022-11-30
Payer: COMMERCIAL

## 2022-11-30 ENCOUNTER — DOCUMENTATION ONLY (OUTPATIENT)
Dept: OTHER | Facility: CLINIC | Age: 47
End: 2022-11-30

## 2022-11-30 VITALS
HEIGHT: 63 IN | SYSTOLIC BLOOD PRESSURE: 114 MMHG | TEMPERATURE: 97.7 F | RESPIRATION RATE: 16 BRPM | HEART RATE: 53 BPM | WEIGHT: 148.37 LBS | OXYGEN SATURATION: 98 % | DIASTOLIC BLOOD PRESSURE: 69 MMHG | BODY MASS INDEX: 26.29 KG/M2

## 2022-11-30 DIAGNOSIS — Z90.710 STATUS POST LAPAROSCOPIC HYSTERECTOMY: Primary | ICD-10-CM

## 2022-11-30 DIAGNOSIS — G89.18 ACUTE POST-OPERATIVE PAIN: ICD-10-CM

## 2022-11-30 LAB
B-HCG SERPL-ACNC: <1 IU/L (ref 0–5)
GLUCOSE BLDC GLUCOMTR-MCNC: 122 MG/DL (ref 70–99)
HOLD SPECIMEN: NORMAL

## 2022-11-30 PROCEDURE — 999N000141 HC STATISTIC PRE-PROCEDURE NURSING ASSESSMENT: Performed by: OBSTETRICS & GYNECOLOGY

## 2022-11-30 PROCEDURE — 88307 TISSUE EXAM BY PATHOLOGIST: CPT | Mod: TC | Performed by: OBSTETRICS & GYNECOLOGY

## 2022-11-30 PROCEDURE — 250N000013 HC RX MED GY IP 250 OP 250 PS 637: Performed by: STUDENT IN AN ORGANIZED HEALTH CARE EDUCATION/TRAINING PROGRAM

## 2022-11-30 PROCEDURE — 250N000025 HC SEVOFLURANE, PER MIN: Performed by: OBSTETRICS & GYNECOLOGY

## 2022-11-30 PROCEDURE — 250N000011 HC RX IP 250 OP 636: Performed by: NURSE ANESTHETIST, CERTIFIED REGISTERED

## 2022-11-30 PROCEDURE — 250N000009 HC RX 250: Performed by: NURSE ANESTHETIST, CERTIFIED REGISTERED

## 2022-11-30 PROCEDURE — 370N000017 HC ANESTHESIA TECHNICAL FEE, PER MIN: Performed by: OBSTETRICS & GYNECOLOGY

## 2022-11-30 PROCEDURE — 88307 TISSUE EXAM BY PATHOLOGIST: CPT | Mod: 26 | Performed by: PATHOLOGY

## 2022-11-30 PROCEDURE — 710N000012 HC RECOVERY PHASE 2, PER MINUTE: Performed by: OBSTETRICS & GYNECOLOGY

## 2022-11-30 PROCEDURE — 36415 COLL VENOUS BLD VENIPUNCTURE: CPT | Performed by: OBSTETRICS & GYNECOLOGY

## 2022-11-30 PROCEDURE — 258N000003 HC RX IP 258 OP 636: Performed by: NURSE ANESTHETIST, CERTIFIED REGISTERED

## 2022-11-30 PROCEDURE — 250N000009 HC RX 250: Performed by: STUDENT IN AN ORGANIZED HEALTH CARE EDUCATION/TRAINING PROGRAM

## 2022-11-30 PROCEDURE — 82962 GLUCOSE BLOOD TEST: CPT

## 2022-11-30 PROCEDURE — 84702 CHORIONIC GONADOTROPIN TEST: CPT | Performed by: OBSTETRICS & GYNECOLOGY

## 2022-11-30 PROCEDURE — 250N000011 HC RX IP 250 OP 636: Performed by: ANESTHESIOLOGY

## 2022-11-30 PROCEDURE — 250N000013 HC RX MED GY IP 250 OP 250 PS 637: Performed by: OBSTETRICS & GYNECOLOGY

## 2022-11-30 PROCEDURE — 710N000010 HC RECOVERY PHASE 1, LEVEL 2, PER MIN: Performed by: OBSTETRICS & GYNECOLOGY

## 2022-11-30 PROCEDURE — 250N000009 HC RX 250: Performed by: ANESTHESIOLOGY

## 2022-11-30 PROCEDURE — 250N000011 HC RX IP 250 OP 636: Performed by: STUDENT IN AN ORGANIZED HEALTH CARE EDUCATION/TRAINING PROGRAM

## 2022-11-30 PROCEDURE — 360N000077 HC SURGERY LEVEL 4, PER MIN: Performed by: OBSTETRICS & GYNECOLOGY

## 2022-11-30 PROCEDURE — 58571 TLH W/T/O 250 G OR LESS: CPT | Mod: GC | Performed by: OBSTETRICS & GYNECOLOGY

## 2022-11-30 PROCEDURE — 272N000001 HC OR GENERAL SUPPLY STERILE: Performed by: OBSTETRICS & GYNECOLOGY

## 2022-11-30 PROCEDURE — 58571 TLH W/T/O 250 G OR LESS: CPT | Mod: 80 | Performed by: OBSTETRICS & GYNECOLOGY

## 2022-11-30 PROCEDURE — 250N000011 HC RX IP 250 OP 636: Performed by: OBSTETRICS & GYNECOLOGY

## 2022-11-30 RX ORDER — ONDANSETRON 2 MG/ML
4 INJECTION INTRAMUSCULAR; INTRAVENOUS EVERY 30 MIN PRN
Status: DISCONTINUED | OUTPATIENT
Start: 2022-11-30 | End: 2022-12-05 | Stop reason: HOSPADM

## 2022-11-30 RX ORDER — SODIUM CHLORIDE, SODIUM LACTATE, POTASSIUM CHLORIDE, CALCIUM CHLORIDE 600; 310; 30; 20 MG/100ML; MG/100ML; MG/100ML; MG/100ML
INJECTION, SOLUTION INTRAVENOUS CONTINUOUS
Status: DISCONTINUED | OUTPATIENT
Start: 2022-11-30 | End: 2022-12-05 | Stop reason: HOSPADM

## 2022-11-30 RX ORDER — ONDANSETRON 4 MG/1
4 TABLET, ORALLY DISINTEGRATING ORAL EVERY 30 MIN PRN
Status: DISCONTINUED | OUTPATIENT
Start: 2022-11-30 | End: 2022-12-05 | Stop reason: HOSPADM

## 2022-11-30 RX ORDER — OXYCODONE HYDROCHLORIDE 5 MG/1
5 TABLET ORAL
Status: COMPLETED | OUTPATIENT
Start: 2022-11-30 | End: 2022-11-30

## 2022-11-30 RX ORDER — OXYCODONE HYDROCHLORIDE 5 MG/1
2.5-5 TABLET ORAL EVERY 6 HOURS PRN
Qty: 6 TABLET | Refills: 0 | Status: SHIPPED | OUTPATIENT
Start: 2022-11-30 | End: 2022-12-03

## 2022-11-30 RX ORDER — SODIUM CHLORIDE, SODIUM LACTATE, POTASSIUM CHLORIDE, CALCIUM CHLORIDE 600; 310; 30; 20 MG/100ML; MG/100ML; MG/100ML; MG/100ML
INJECTION, SOLUTION INTRAVENOUS CONTINUOUS PRN
Status: DISCONTINUED | OUTPATIENT
Start: 2022-11-30 | End: 2022-11-30

## 2022-11-30 RX ORDER — SCOLOPAMINE TRANSDERMAL SYSTEM 1 MG/1
1 PATCH, EXTENDED RELEASE TRANSDERMAL
Status: COMPLETED | OUTPATIENT
Start: 2022-11-30 | End: 2022-12-03

## 2022-11-30 RX ORDER — NALOXONE HYDROCHLORIDE 0.4 MG/ML
0.2 INJECTION, SOLUTION INTRAMUSCULAR; INTRAVENOUS; SUBCUTANEOUS
Status: DISCONTINUED | OUTPATIENT
Start: 2022-11-30 | End: 2022-11-30 | Stop reason: HOSPADM

## 2022-11-30 RX ORDER — FENTANYL CITRATE 50 UG/ML
25-50 INJECTION, SOLUTION INTRAMUSCULAR; INTRAVENOUS
Status: DISCONTINUED | OUTPATIENT
Start: 2022-11-30 | End: 2022-11-30 | Stop reason: HOSPADM

## 2022-11-30 RX ORDER — KETOROLAC TROMETHAMINE 30 MG/ML
INJECTION, SOLUTION INTRAMUSCULAR; INTRAVENOUS PRN
Status: DISCONTINUED | OUTPATIENT
Start: 2022-11-30 | End: 2022-11-30

## 2022-11-30 RX ORDER — BUPIVACAINE HYDROCHLORIDE 2.5 MG/ML
INJECTION, SOLUTION EPIDURAL; INFILTRATION; INTRACAUDAL
Status: COMPLETED | OUTPATIENT
Start: 2022-11-30 | End: 2022-11-30

## 2022-11-30 RX ORDER — CEFAZOLIN SODIUM/WATER 2 G/20 ML
2 SYRINGE (ML) INTRAVENOUS
Status: COMPLETED | OUTPATIENT
Start: 2022-11-30 | End: 2022-11-30

## 2022-11-30 RX ORDER — DEXMEDETOMIDINE HYDROCHLORIDE 4 UG/ML
INJECTION, SOLUTION INTRAVENOUS
Status: COMPLETED | OUTPATIENT
Start: 2022-11-30 | End: 2022-11-30

## 2022-11-30 RX ORDER — ACETAMINOPHEN 325 MG/1
650 TABLET ORAL EVERY 6 HOURS PRN
Qty: 100 TABLET | Refills: 0 | Status: SHIPPED | OUTPATIENT
Start: 2022-11-30 | End: 2024-01-05

## 2022-11-30 RX ORDER — HYDROMORPHONE HYDROCHLORIDE 1 MG/ML
0.4 INJECTION, SOLUTION INTRAMUSCULAR; INTRAVENOUS; SUBCUTANEOUS EVERY 5 MIN PRN
Status: DISCONTINUED | OUTPATIENT
Start: 2022-11-30 | End: 2022-12-05 | Stop reason: HOSPADM

## 2022-11-30 RX ORDER — DEXAMETHASONE SODIUM PHOSPHATE 4 MG/ML
INJECTION, SOLUTION INTRA-ARTICULAR; INTRALESIONAL; INTRAMUSCULAR; INTRAVENOUS; SOFT TISSUE PRN
Status: DISCONTINUED | OUTPATIENT
Start: 2022-11-30 | End: 2022-11-30

## 2022-11-30 RX ORDER — ONDANSETRON 2 MG/ML
INJECTION INTRAMUSCULAR; INTRAVENOUS PRN
Status: DISCONTINUED | OUTPATIENT
Start: 2022-11-30 | End: 2022-11-30

## 2022-11-30 RX ORDER — NALOXONE HYDROCHLORIDE 0.4 MG/ML
0.4 INJECTION, SOLUTION INTRAMUSCULAR; INTRAVENOUS; SUBCUTANEOUS
Status: DISCONTINUED | OUTPATIENT
Start: 2022-11-30 | End: 2022-11-30 | Stop reason: HOSPADM

## 2022-11-30 RX ORDER — PROPOFOL 10 MG/ML
INJECTION, EMULSION INTRAVENOUS PRN
Status: DISCONTINUED | OUTPATIENT
Start: 2022-11-30 | End: 2022-11-30

## 2022-11-30 RX ORDER — FENTANYL CITRATE 50 UG/ML
50 INJECTION, SOLUTION INTRAMUSCULAR; INTRAVENOUS EVERY 5 MIN PRN
Status: DISCONTINUED | OUTPATIENT
Start: 2022-11-30 | End: 2022-12-05 | Stop reason: HOSPADM

## 2022-11-30 RX ORDER — HALOPERIDOL 2 MG/ML
1 SOLUTION ORAL ONCE
Status: DISCONTINUED | OUTPATIENT
Start: 2022-11-30 | End: 2022-12-05 | Stop reason: HOSPADM

## 2022-11-30 RX ORDER — FENTANYL CITRATE 50 UG/ML
25 INJECTION, SOLUTION INTRAMUSCULAR; INTRAVENOUS EVERY 5 MIN PRN
Status: DISCONTINUED | OUTPATIENT
Start: 2022-11-30 | End: 2022-12-05 | Stop reason: HOSPADM

## 2022-11-30 RX ORDER — LIDOCAINE HYDROCHLORIDE 20 MG/ML
INJECTION, SOLUTION INFILTRATION; PERINEURAL PRN
Status: DISCONTINUED | OUTPATIENT
Start: 2022-11-30 | End: 2022-11-30

## 2022-11-30 RX ORDER — ACETAMINOPHEN 325 MG/1
975 TABLET ORAL ONCE
Status: COMPLETED | OUTPATIENT
Start: 2022-11-30 | End: 2022-11-30

## 2022-11-30 RX ORDER — CEFAZOLIN SODIUM/WATER 2 G/20 ML
2 SYRINGE (ML) INTRAVENOUS SEE ADMIN INSTRUCTIONS
Status: DISCONTINUED | OUTPATIENT
Start: 2022-11-30 | End: 2022-11-30 | Stop reason: HOSPADM

## 2022-11-30 RX ORDER — FLUMAZENIL 0.1 MG/ML
0.2 INJECTION, SOLUTION INTRAVENOUS
Status: DISCONTINUED | OUTPATIENT
Start: 2022-11-30 | End: 2022-11-30 | Stop reason: HOSPADM

## 2022-11-30 RX ORDER — HALOPERIDOL 5 MG/ML
1 INJECTION INTRAMUSCULAR
Status: COMPLETED | OUTPATIENT
Start: 2022-11-30 | End: 2022-11-30

## 2022-11-30 RX ORDER — AMOXICILLIN 250 MG
1-2 CAPSULE ORAL DAILY PRN
Qty: 60 TABLET | Refills: 1 | Status: SHIPPED | OUTPATIENT
Start: 2022-11-30 | End: 2024-01-05

## 2022-11-30 RX ORDER — HYDROMORPHONE HYDROCHLORIDE 1 MG/ML
0.2 INJECTION, SOLUTION INTRAMUSCULAR; INTRAVENOUS; SUBCUTANEOUS EVERY 5 MIN PRN
Status: DISCONTINUED | OUTPATIENT
Start: 2022-11-30 | End: 2022-12-05 | Stop reason: HOSPADM

## 2022-11-30 RX ORDER — PHENAZOPYRIDINE HYDROCHLORIDE 200 MG/1
200 TABLET, FILM COATED ORAL ONCE
Status: COMPLETED | OUTPATIENT
Start: 2022-11-30 | End: 2022-11-30

## 2022-11-30 RX ORDER — DEXAMETHASONE SODIUM PHOSPHATE 10 MG/ML
INJECTION, SOLUTION INTRAMUSCULAR; INTRAVENOUS
Status: COMPLETED | OUTPATIENT
Start: 2022-11-30 | End: 2022-11-30

## 2022-11-30 RX ORDER — FENTANYL CITRATE 50 UG/ML
INJECTION, SOLUTION INTRAMUSCULAR; INTRAVENOUS PRN
Status: DISCONTINUED | OUTPATIENT
Start: 2022-11-30 | End: 2022-11-30

## 2022-11-30 RX ADMIN — HYDROMORPHONE HYDROCHLORIDE 0.2 MG: 1 INJECTION, SOLUTION INTRAMUSCULAR; INTRAVENOUS; SUBCUTANEOUS at 14:50

## 2022-11-30 RX ADMIN — BUPIVACAINE HYDROCHLORIDE 50 ML: 2.5 INJECTION, SOLUTION EPIDURAL; INFILTRATION; INTRACAUDAL at 11:51

## 2022-11-30 RX ADMIN — Medication 20 MG: at 12:48

## 2022-11-30 RX ADMIN — ACETAMINOPHEN 975 MG: 325 TABLET, FILM COATED ORAL at 09:52

## 2022-11-30 RX ADMIN — OXYCODONE HYDROCHLORIDE 5 MG: 5 TABLET ORAL at 15:13

## 2022-11-30 RX ADMIN — KETOROLAC TROMETHAMINE 30 MG: 30 INJECTION, SOLUTION INTRAMUSCULAR at 13:44

## 2022-11-30 RX ADMIN — DEXAMETHASONE SODIUM PHOSPHATE 8 MG: 4 INJECTION, SOLUTION INTRA-ARTICULAR; INTRALESIONAL; INTRAMUSCULAR; INTRAVENOUS; SOFT TISSUE at 13:44

## 2022-11-30 RX ADMIN — DEXAMETHASONE SODIUM PHOSPHATE 2 MG: 10 INJECTION, SOLUTION INTRAMUSCULAR; INTRAVENOUS at 11:51

## 2022-11-30 RX ADMIN — PHENYLEPHRINE HYDROCHLORIDE 200 MCG: 10 INJECTION INTRAVENOUS at 12:20

## 2022-11-30 RX ADMIN — MIDAZOLAM 2 MG: 1 INJECTION INTRAMUSCULAR; INTRAVENOUS at 11:34

## 2022-11-30 RX ADMIN — FENTANYL CITRATE 50 MCG: 50 INJECTION, SOLUTION INTRAMUSCULAR; INTRAVENOUS at 11:45

## 2022-11-30 RX ADMIN — Medication 20 MG: at 13:33

## 2022-11-30 RX ADMIN — PROPOFOL 150 MG: 10 INJECTION, EMULSION INTRAVENOUS at 11:45

## 2022-11-30 RX ADMIN — LIDOCAINE HYDROCHLORIDE 100 MG: 20 INJECTION, SOLUTION INFILTRATION; PERINEURAL at 11:45

## 2022-11-30 RX ADMIN — Medication 2 G: at 11:34

## 2022-11-30 RX ADMIN — SODIUM CHLORIDE, POTASSIUM CHLORIDE, SODIUM LACTATE AND CALCIUM CHLORIDE: 600; 310; 30; 20 INJECTION, SOLUTION INTRAVENOUS at 13:46

## 2022-11-30 RX ADMIN — FENTANYL CITRATE 25 MCG: 50 INJECTION INTRAMUSCULAR; INTRAVENOUS at 14:36

## 2022-11-30 RX ADMIN — ONDANSETRON 4 MG: 2 INJECTION INTRAMUSCULAR; INTRAVENOUS at 13:44

## 2022-11-30 RX ADMIN — FENTANYL CITRATE 50 MCG: 50 INJECTION, SOLUTION INTRAMUSCULAR; INTRAVENOUS at 13:33

## 2022-11-30 RX ADMIN — ACETAMINOPHEN 975 MG: 325 TABLET, FILM COATED ORAL at 17:14

## 2022-11-30 RX ADMIN — HALOPERIDOL LACTATE 1 MG: 5 INJECTION, SOLUTION INTRAMUSCULAR at 18:10

## 2022-11-30 RX ADMIN — PHENAZOPYRIDINE HYDROCHLORIDE 200 MG: 200 TABLET, FILM COATED ORAL at 09:52

## 2022-11-30 RX ADMIN — SODIUM CHLORIDE, POTASSIUM CHLORIDE, SODIUM LACTATE AND CALCIUM CHLORIDE: 600; 310; 30; 20 INJECTION, SOLUTION INTRAVENOUS at 11:37

## 2022-11-30 RX ADMIN — DEXMEDETOMIDINE 40 MCG: 100 INJECTION, SOLUTION, CONCENTRATE INTRAVENOUS at 11:51

## 2022-11-30 RX ADMIN — ONDANSETRON 4 MG: 2 INJECTION INTRAMUSCULAR; INTRAVENOUS at 15:40

## 2022-11-30 RX ADMIN — SCOPALAMINE 1 PATCH: 1 PATCH, EXTENDED RELEASE TRANSDERMAL at 16:57

## 2022-11-30 RX ADMIN — Medication 50 MG: at 11:45

## 2022-11-30 ASSESSMENT — ACTIVITIES OF DAILY LIVING (ADL)
ADLS_ACUITY_SCORE: 35
ADLS_ACUITY_SCORE: 33
ADLS_ACUITY_SCORE: 35
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
## 2022-11-30 NOTE — DISCHARGE INSTRUCTIONS
Ibuprofen last given at 1:44pm in surgery.  *Next ok at 7:44pm*    Same-Day Surgery   Adult Discharge Orders & Instructions     For 24 hours after surgery:  Get plenty of rest.  A responsible adult must stay with you for at least 24 hours after you leave the hospital.   Pain medication can slow your reflexes. Do not drive or use heavy equipment.  If you have weakness or tingling, don't drive or use heavy equipment until this feeling goes away.  Mixing alcohol and pain medication can cause dizziness and slow your breathing. It can even be fatal. Do not drink alcohol while taking pain medication.  Avoid strenuous or risky activities.  Ask for help when climbing stairs.   You may feel lightheaded.  If so, sit for a few minutes before standing.  Have someone help you get up.   If you have nausea (feel sick to your stomach), drink only clear liquids such as apple juice, ginger ale, broth or 7-Up.  Rest may also help.  Be sure to drink enough fluids.  Move to a regular diet as you feel able. Take pain medications with a small amount of solid food, such as toast or crackers, to avoid nausea.   A slight fever is normal. Call the doctor if your fever is over 100 F (37.7 C) (taken under the tongue) or lasts longer than 24 hours.  You may have a dry mouth, muscle aches, trouble sleeping or a sore throat.  These symptoms should go away after 24 hours.  Do not make important or legal decisions.   Pain Management:      1. Take pain medication (if prescribed) for pain as directed by your physician.        2. WARNING: If the pain medication you have been prescribed contains Tylenol  (acetaminophen), DO NOT take additional doses of Tylenol (acetaminophen).     Call your doctor for any of the followin.  Signs of infection (fever, growing tenderness at the surgery site, severe pain, a large amount of drainage or bleeding, foul-smelling drainage, redness, swelling).    2.  It has been over 8 to 10 hours since surgery and you are  "still not able to urinate (pee).    3.  Headache for over 24 hours.    4.  Numbness, tingling or weakness the day after surgery (if you had spinal anesthesia).  To contact a doctor, call _______________Dr. Christiansen, OB/GYN Clinic at [phone] ______________________ or:  '   [phone] and ask for the Resident On Call for:          _______OBGYN___________________________________ (answered 24 hours a day)  '   Emergency Department:  [facility]: [phone]  [facility]: [phone]               Rev. 10/2014      Discharge Instructions:   Following a Laparoscopy    Comfort:  The amount of discomfort you can expect is very unpredictable.   If you have pain that cannot be controlled with non aspirin medication or with the prescription medication you may have received, you should notify your physician.     You May Experience:  Abdominal tenderness; abdominal cramps (like menstrual cramps).  Low back ache or discomfort radiating to your shoulders, chest, back or neck. This is a result of the gas used to inflate your abdomen during surgery. This gas is absorbed in 24 to 36 hours. The \"knee chest\" position will help relieve this discomfort.  Sore throat for a day or two resulting from the anesthesia tube used during surgery. You may use throat lozenges to help relieve this discomfort.  Black and blue marks on your abdomen.    Drainage:  You may expect a small amount of drainage from the incision on your abdomen and you may change the bandage when necessary.  You may also have a small amount of vaginal drainage for 3 to 4 days; this is normal and no cause for concern. If excessive bleeding occurs, notify your physician.  Do not douche, and use a pad rather than tampons. Do not resume intercourse for at least one week or until bleeding has ceased.    Home Activity:  The day of surgery spend a quiet day at home.  Increase activity as tolerated.  You may bathe or shower, do not soak " in bath tub or scrub incisions.  You have no restrictions on your diet. Following surgery, drink plenty of fluids and eat a light meal.  The anesthesia may produce some nausea. If you feel nauseated, stay in bed, keep your head down and try drinking fluids such as Seven-Up, tea or soup.    Notify Physician at Once IF:  You have a fever over 100.4 degrees. A low grade fever (under 100 degrees) is usual after surgery.  You have severe pain.  You have a large amount of bleeding or drainage.    Important numbers  [facility] ([address]) - [place]: [phone]   [facility] ([address]) : [phone]    Next IBUPROFEN due at or after 7:45 pm

## 2022-11-30 NOTE — ANESTHESIA PROCEDURE NOTES
Airway       Patient location during procedure: OR       Procedure Start/Stop Times: 11/30/2022 11:51 AM  Staff -        Anesthesiologist:  Kandy Pérez MD       CRNA: Javier Jackson APRN CRNA       Performed By: CRNA  Consent for Airway        Urgency: elective  Indications and Patient Condition       Indications for airway management: madai-procedural       Induction type:intravenous       Mask difficulty assessment: 1 - vent by mask    Final Airway Details       Final airway type: endotracheal airway       Successful airway: ETT - single  Endotracheal Airway Details        ETT size (mm): 7.0       Cuffed: yes       Successful intubation technique: direct laryngoscopy       DL Blade Type: Harris 2       Grade View of Cords: 1       Adjucts: stylet       Position: Right       Measured from: gums/teeth       Secured at (cm): 22       Bite block used: None    Post intubation assessment        Placement verified by: capnometry, equal breath sounds and chest rise        Number of attempts at approach: 1       Number of other approaches attempted: 0       Secured with: silk tape       Ease of procedure: easy       Dentition: Intact and Unchanged    Medication(s) Administered   Medication Administration Time: 11/30/2022 11:51 AM
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
## 2022-11-30 NOTE — ANESTHESIA CARE TRANSFER NOTE
Patient: Ulises Butler    Procedure: Procedure(s):  HYSTERECTOMY, TOTAL, LAPAROSCOPIC, WITH BILATERAL SALPINGECTOMY       Diagnosis: Abnormal uterine bleeding (AUB) [N93.9]  Adenomyosis [N80.03]  Diagnosis Additional Information: No value filed.    Anesthesia Type:   General     Note:    Oropharynx: oropharynx clear of all foreign objects  Level of Consciousness: awake  Oxygen Supplementation: face mask  Level of Supplemental Oxygen (L/min / FiO2): 8  Independent Airway: airway patency satisfactory and stable  Dentition: dentition unchanged  Vital Signs Stable: post-procedure vital signs reviewed and stable  Report to RN Given: handoff report given  Patient transferred to: PACU    Handoff Report: Identifed the Patient, Identified the Reponsible Provider, Reviewed the pertinent medical history, Discussed the surgical course, Reviewed Intra-OP anesthesia mangement and issues during anesthesia, Set expectations for post-procedure period and Allowed opportunity for questions and acknowledgement of understanding      Vitals:  Vitals Value Taken Time   /71    Temp 36.6    Pulse 75    Resp 14    SpO2 97 % 11/30/22 1403   Vitals shown include unvalidated device data.    Electronically Signed By: PRESTON Oquendo CRNA  November 30, 2022  2:04 PM

## 2022-11-30 NOTE — ANESTHESIA POSTPROCEDURE EVALUATION
Patient: Ulises Butler    Procedure: Procedure(s):  HYSTERECTOMY, TOTAL, LAPAROSCOPIC, WITH BILATERAL SALPINGECTOMY       Anesthesia Type:  General    Note:  Disposition: Inpatient   Postop Pain Control: Uneventful            Sign Out: Well controlled pain   PONV: No   Neuro/Psych: Uneventful            Sign Out: Acceptable/Baseline neuro status   Airway/Respiratory: Uneventful            Sign Out: Acceptable/Baseline resp. status   CV/Hemodynamics: Uneventful            Sign Out: Acceptable CV status; No obvious hypovolemia; No obvious fluid overload   Other NRE:    DID A NON-ROUTINE EVENT OCCUR?            Last vitals:  Vitals Value Taken Time   /73 11/30/22 1530   Temp 36.6  C (97.8  F) 11/30/22 1445   Pulse 62 11/30/22 1536   Resp 23 11/30/22 1537   SpO2 99 % 11/30/22 1538   Vitals shown include unvalidated device data.    Electronically Signed By: Kandy Rivas MD  November 30, 2022  5:09 PM

## 2022-11-30 NOTE — ANESTHESIA PROCEDURE NOTES
TAP Procedure Note    Pre-Procedure   Staff -        Anesthesiologist:  Medardo Palomo MD       Resident/Fellow: Marsha Vizcaino MD       Performed By: resident       Location: OR       Procedure Start/Stop Times: 11/30/2022 11:51 AM and 11/30/2022 11:56 AM       Pre-Anesthestic Checklist: patient identified, IV checked, site marked, risks and benefits discussed, informed consent, monitors and equipment checked, pre-op evaluation, at physician/surgeon's request and post-op pain management  Timeout:       Correct Patient: Yes        Correct Procedure: Yes        Correct Site: Yes        Correct Position: Yes        Correct Laterality: Yes        Site Marked: Yes  Procedure Documentation  Procedure: TAP       Diagnosis: POST OPERATIVE PAIN       Laterality: bilateral       Patient Position: supine       Patient Prep/Sterile Barriers: sterile gloves, mask       Skin prep: Chloraprep       Needle Type: short bevel       Needle Gauge: 21.        Needle Length (millimeters): 110        Ultrasound guided       1. Ultrasound was used to identify targeted nerve, plexus, vascular marker, or fascial plane and place a needle adjacent to it in real-time.       2. Ultrasound was used to visualize the spread of anesthetic in close proximity to the above referenced structure.       3. A permanent image is entered into the patient's record.    Assessment/Narrative         The placement was negative for: blood aspirated, painful injection and site bleeding       Paresthesias: No.       Bolus given via needle..        Secured via.        Insertion/Infusion Method: Single Shot       Complications: none       Injection made incrementally with aspirations every 5 mL.    Medication(s) Administered   Bupivacaine 0.25% PF (Infiltration) - Infiltration   50 mL - 11/30/2022 11:51:00 AM  Dexmedetomidine 4 mcg/mL (Perineural) - Perineural   40 mcg - 11/30/2022 11:51:00 AM  Dexamethasone 10 mg/mL PF (Perineural) - Perineural   2 mg -  "11/30/2022 11:51:00 AM  Medication Administration Time: 11/30/2022 11:51 AM      FOR [facility] ONLY:   Pain Team Contact information: please page the Pain Team Via WIB. Search \"Pain\". During daytime hours, please page the attending first. At night please page the resident first.    "

## 2022-12-01 ASSESSMENT — ACTIVITIES OF DAILY LIVING (ADL)
ADLS_ACUITY_SCORE: 35

## 2022-12-02 ENCOUNTER — TELEPHONE (OUTPATIENT)
Dept: OBGYN | Facility: CLINIC | Age: 47
End: 2022-12-02

## 2022-12-02 ASSESSMENT — ACTIVITIES OF DAILY LIVING (ADL)
ADLS_ACUITY_SCORE: 35

## 2022-12-02 NOTE — TELEPHONE ENCOUNTER
Ulises Butler is a 47 year old who underwent total laparoscopic hysterectomy on 11/30. I called patient to follow up to see how she is feeling after the procedure. Still feeling bloated and uncomfortable from CO2. Ambulating, eating and drinking. Voiding. No bowel movement yet. Pain well controlled. Coughing up a little mucus but does not feel sick. Encouraged deep breaths, call if any concerns.     Jackie Christiansen MD

## 2022-12-03 ASSESSMENT — ACTIVITIES OF DAILY LIVING (ADL)
ADLS_ACUITY_SCORE: 35

## 2022-12-04 ASSESSMENT — ACTIVITIES OF DAILY LIVING (ADL)
ADLS_ACUITY_SCORE: 35

## 2022-12-05 LAB
PATH REPORT.COMMENTS IMP SPEC: NORMAL
PATH REPORT.FINAL DX SPEC: NORMAL
PATH REPORT.GROSS SPEC: NORMAL
PATH REPORT.MICROSCOPIC SPEC OTHER STN: NORMAL
PATH REPORT.RELEVANT HX SPEC: NORMAL
PHOTO IMAGE: NORMAL

## 2022-12-12 ENCOUNTER — TELEPHONE (OUTPATIENT)
Dept: OBGYN | Facility: CLINIC | Age: 47
End: 2022-12-12

## 2022-12-12 NOTE — TELEPHONE ENCOUNTER
Post Hysterectomy 11/30  Post op vaginal bleeding tapered to basically nothing up until 12/10.    Patient did vacuuming and house work on Saturday (12/10)  vaginal bleeding then increased in amount 12/10- bright red blood (per patient approx. 2 Tbsp every 1 hour), no clots, some cramping.  Had very minimal bleeding/ spotting if anything, on Sunday 12/11, no cramps  12/12 had bright red bleeding (approx. 2 Tbsp every 1 hour), no clots, lots of cramping.  No thing symptoms or concerns.    Patient has post op visit 12/16    Patient was told to start her immunosuppressants on 12/14 but she is wondering if she should wait another week due to bleeding?    I advised patient to continue to monitor this bleeding, rest, and hydrate.   Also, advised to be evaluated in ED if she starts saturating a pad in a hour, passes large clots, or has severe abdominal pain.      Routing to primary and oncall for additional recommendations if needed.    Ginna Nguyen RN

## 2022-12-12 NOTE — TELEPHONE ENCOUNTER
"She just did too much and should limit her activity again. No pushing/pulling or lifting more than 10 lbs. Usually that includes vacuuming    Did not \"ruin\" anything. Since has post op appt coming up, I would wait to start immunosuppressant until then    Agree with ER if increased bleeding, pain, fever, etc    Radha Wiseman MD    "

## 2022-12-14 ENCOUNTER — HOSPITAL ENCOUNTER (EMERGENCY)
Facility: CLINIC | Age: 47
Discharge: HOME | End: 2022-12-14
Attending: EMERGENCY MEDICINE | Admitting: EMERGENCY MEDICINE
Payer: COMMERCIAL

## 2022-12-14 ENCOUNTER — TELEPHONE (OUTPATIENT)
Dept: OBGYN | Facility: CLINIC | Age: 47
End: 2022-12-14

## 2022-12-14 ENCOUNTER — APPOINTMENT (OUTPATIENT)
Dept: CT IMAGING | Facility: CLINIC | Age: 47
End: 2022-12-14
Attending: STUDENT IN AN ORGANIZED HEALTH CARE EDUCATION/TRAINING PROGRAM
Payer: COMMERCIAL

## 2022-12-14 ENCOUNTER — HOSPITAL ENCOUNTER (EMERGENCY)
Facility: CLINIC | Age: 47
Discharge: STILL ADMITTED OR ADMINISTRATIVE | End: 2022-12-14
Attending: INTERNAL MEDICINE
Payer: COMMERCIAL

## 2022-12-14 VITALS
WEIGHT: 148 LBS | TEMPERATURE: 98.6 F | HEART RATE: 76 BPM | HEIGHT: 63 IN | SYSTOLIC BLOOD PRESSURE: 123 MMHG | BODY MASS INDEX: 26.22 KG/M2 | DIASTOLIC BLOOD PRESSURE: 94 MMHG | RESPIRATION RATE: 16 BRPM | OXYGEN SATURATION: 98 %

## 2022-12-14 DIAGNOSIS — Z98.890 POSTOPERATIVE STATE: ICD-10-CM

## 2022-12-14 DIAGNOSIS — N93.9 VAGINAL BLEEDING: ICD-10-CM

## 2022-12-14 LAB
ABO/RH(D): NORMAL
ANION GAP SERPL CALCULATED.3IONS-SCNC: 12 MMOL/L (ref 7–15)
ANTIBODY SCREEN: NEGATIVE
BASOPHILS # BLD AUTO: 0.1 10E3/UL (ref 0–0.2)
BASOPHILS NFR BLD AUTO: 1 %
BUN SERPL-MCNC: 13.8 MG/DL (ref 6–20)
CALCIUM SERPL-MCNC: 9.9 MG/DL (ref 8.6–10)
CHLORIDE SERPL-SCNC: 101 MMOL/L (ref 98–107)
CREAT SERPL-MCNC: 0.61 MG/DL (ref 0.51–0.95)
DEPRECATED HCO3 PLAS-SCNC: 26 MMOL/L (ref 22–29)
EOSINOPHIL # BLD AUTO: 0.1 10E3/UL (ref 0–0.7)
EOSINOPHIL NFR BLD AUTO: 2 %
ERYTHROCYTE [DISTWIDTH] IN BLOOD BY AUTOMATED COUNT: 11.8 % (ref 10–15)
GFR SERPL CREATININE-BSD FRML MDRD: >90 ML/MIN/1.73M2
GLUCOSE SERPL-MCNC: 104 MG/DL (ref 70–99)
HCT VFR BLD AUTO: 43.7 % (ref 35–47)
HGB BLD-MCNC: 14.4 G/DL (ref 11.7–15.7)
IMM GRANULOCYTES # BLD: 0 10E3/UL
IMM GRANULOCYTES NFR BLD: 0 %
LYMPHOCYTES # BLD AUTO: 2.1 10E3/UL (ref 0.8–5.3)
LYMPHOCYTES NFR BLD AUTO: 34 %
MCH RBC QN AUTO: 30.4 PG (ref 26.5–33)
MCHC RBC AUTO-ENTMCNC: 33 G/DL (ref 31.5–36.5)
MCV RBC AUTO: 92 FL (ref 78–100)
MONOCYTES # BLD AUTO: 0.5 10E3/UL (ref 0–1.3)
MONOCYTES NFR BLD AUTO: 7 %
NEUTROPHILS # BLD AUTO: 3.4 10E3/UL (ref 1.6–8.3)
NEUTROPHILS NFR BLD AUTO: 56 %
NRBC # BLD AUTO: 0 10E3/UL
NRBC BLD AUTO-RTO: 0 /100
PLATELET # BLD AUTO: 261 10E3/UL (ref 150–450)
POTASSIUM SERPL-SCNC: 4.2 MMOL/L (ref 3.4–5.3)
RBC # BLD AUTO: 4.73 10E6/UL (ref 3.8–5.2)
SODIUM SERPL-SCNC: 139 MMOL/L (ref 136–145)
SPECIMEN EXPIRATION DATE: NORMAL
WBC # BLD AUTO: 6.2 10E3/UL (ref 4–11)

## 2022-12-14 PROCEDURE — 250N000013 HC RX MED GY IP 250 OP 250 PS 637: Performed by: EMERGENCY MEDICINE

## 2022-12-14 PROCEDURE — 85049 AUTOMATED PLATELET COUNT: CPT | Performed by: EMERGENCY MEDICINE

## 2022-12-14 PROCEDURE — 82310 ASSAY OF CALCIUM: CPT | Performed by: EMERGENCY MEDICINE

## 2022-12-14 PROCEDURE — 250N000009 HC RX 250: Performed by: INTERNAL MEDICINE

## 2022-12-14 PROCEDURE — 99284 EMERGENCY DEPT VISIT MOD MDM: CPT | Performed by: EMERGENCY MEDICINE

## 2022-12-14 PROCEDURE — 36415 COLL VENOUS BLD VENIPUNCTURE: CPT | Performed by: EMERGENCY MEDICINE

## 2022-12-14 PROCEDURE — 74177 CT ABD & PELVIS W/CONTRAST: CPT

## 2022-12-14 PROCEDURE — 86901 BLOOD TYPING SEROLOGIC RH(D): CPT | Performed by: EMERGENCY MEDICINE

## 2022-12-14 PROCEDURE — 86850 RBC ANTIBODY SCREEN: CPT | Performed by: EMERGENCY MEDICINE

## 2022-12-14 PROCEDURE — 99285 EMERGENCY DEPT VISIT HI MDM: CPT | Mod: 25 | Performed by: EMERGENCY MEDICINE

## 2022-12-14 PROCEDURE — 250N000011 HC RX IP 250 OP 636: Performed by: INTERNAL MEDICINE

## 2022-12-14 PROCEDURE — 250N000013 HC RX MED GY IP 250 OP 250 PS 637: Performed by: INTERNAL MEDICINE

## 2022-12-14 RX ORDER — ACETAMINOPHEN 500 MG
1000 TABLET ORAL ONCE
Status: COMPLETED | OUTPATIENT
Start: 2022-12-14 | End: 2022-12-14

## 2022-12-14 RX ORDER — IBUPROFEN 600 MG/1
600 TABLET, FILM COATED ORAL ONCE
Status: COMPLETED | OUTPATIENT
Start: 2022-12-14 | End: 2022-12-14

## 2022-12-14 RX ORDER — IOPAMIDOL 755 MG/ML
100 INJECTION, SOLUTION INTRAVASCULAR ONCE
Status: COMPLETED | OUTPATIENT
Start: 2022-12-14 | End: 2022-12-14

## 2022-12-14 RX ADMIN — ACETAMINOPHEN 1000 MG: 500 TABLET ORAL at 14:58

## 2022-12-14 RX ADMIN — SILVER NITRATE APPLICATORS: 25; 75 STICK TOPICAL at 21:01

## 2022-12-14 RX ADMIN — IOPAMIDOL 72 ML: 755 INJECTION, SOLUTION INTRAVENOUS at 19:51

## 2022-12-14 RX ADMIN — SODIUM CHLORIDE 58 ML: 9 INJECTION, SOLUTION INTRAVENOUS at 19:51

## 2022-12-14 RX ADMIN — IBUPROFEN 600 MG: 600 TABLET ORAL at 20:58

## 2022-12-14 ASSESSMENT — ACTIVITIES OF DAILY LIVING (ADL)
ADLS_ACUITY_SCORE: 35

## 2022-12-14 ASSESSMENT — ENCOUNTER SYMPTOMS: ABDOMINAL PAIN: 1

## 2022-12-14 NOTE — ED TRIAGE NOTES
Pt comes in through triage for vaginal bleeding. Pt had a hysterectomy 2 weeks ago. Today patient filled one pad in under and hour and there was a silver dollar sized blood clot. Pt said she has for sure filled two pads but has gone through about 4-5 pads since this morning. Having worsening cramping.      Triage Assessment     Row Name 12/14/22 2427       Triage Assessment (Adult)    Airway WDL WDL       Respiratory WDL    Respiratory WDL WDL       Skin Circulation/Temperature WDL    Skin Circulation/Temperature WDL WDL       Cardiac WDL    Cardiac WDL WDL       Peripheral/Neurovascular WDL    Peripheral Neurovascular WDL WDL       Cognitive/Neuro/Behavioral WDL    Cognitive/Neuro/Behavioral WDL WDL
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
## 2022-12-14 NOTE — TELEPHONE ENCOUNTER
Pt had surgery with SL 11/30. Is still experiencing bleeding and passing large clots. Pt wants to know if she should visit the ER. Post op visit not until 12/16.

## 2022-12-14 NOTE — TELEPHONE ENCOUNTER
"7/10: Patient s/p left heart cath with PCI to LAD. Patient sitting up in chair in no acute distress. Patient with multiple episodes of asymptomatic ventricular tachycardia. Patient complains of nausea and sternal chest pain. Patient on nitroglycerin and tirofiban infusions.   7/11: Patient sitting up in chair in no acute distress. Patient in sinus tachycardia with PVCs. Ventricular tachycardia has resolved. Patient states, "I am feeling much better today." Patient weaned off of nitroglycerin infusion yesterday evening.   " Post lap hyst 11/30  Heavy bleeding    Passing large clots - size of silver dollar   cramps that tylenol and ibuprofen are not helping at all.    Per recommendations from Dr. Wiseman from  on 12/12 - I advised patient to be evaluated in the ED.
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
## 2022-12-15 NOTE — DISCHARGE INSTRUCTIONS
Please follow Gyn's precautions and  follow up with OB/Gyn - [facility] (phone: [phone]) Friday as scheduled even if entirely better.

## 2022-12-16 ENCOUNTER — OFFICE VISIT (OUTPATIENT)
Dept: OBGYN | Facility: CLINIC | Age: 47
End: 2022-12-16
Payer: COMMERCIAL

## 2022-12-16 ENCOUNTER — NURSE TRIAGE (OUTPATIENT)
Dept: NURSING | Facility: CLINIC | Age: 47
End: 2022-12-16

## 2022-12-16 VITALS — DIASTOLIC BLOOD PRESSURE: 79 MMHG | HEART RATE: 74 BPM | TEMPERATURE: 98.3 F | SYSTOLIC BLOOD PRESSURE: 122 MMHG

## 2022-12-16 DIAGNOSIS — N93.9 VAGINA BLEEDING: ICD-10-CM

## 2022-12-16 DIAGNOSIS — Z90.710 STATUS POST LAPAROSCOPIC HYSTERECTOMY: Primary | ICD-10-CM

## 2022-12-16 PROCEDURE — 99024 POSTOP FOLLOW-UP VISIT: CPT | Performed by: OBSTETRICS & GYNECOLOGY

## 2022-12-17 ENCOUNTER — TELEPHONE (OUTPATIENT)
Dept: OBGYN | Facility: CLINIC | Age: 47
End: 2022-12-17

## 2022-12-17 NOTE — PROGRESS NOTES
"GYN Clinic Visit  2022  CC:  Post-operative visit    HPI:  Ulises Butler is a 47 year old  who presents 2 weeks status post total laparoscopic hysterectomy with bilateral salpingectomy on  for abnormal uterine bleeding secondary to adenomyosis.     Since her surgery patient reports spotting but then on  she had an episode of very heavy bleeding. She went to the ED. CT scan was normal. hgb was 14.4. exam in ED showed bleeding from right edge of cuff \" Vaginal cuff appears intact, small old blood clot removed from top of cuff. Right apex of cuff with raw edge that has some slow oozing.  Cuff is tender to palpation, especially right aspect. Cuff palpates intact. Monsels solution placed over entirety of cuff, no bleeding noted at right aspect following Monsels.\"      Since going to the ED she reports great improvement in pain and no bleeding.  Appetite is normal. No nausea or vomiting. Voiding without difficulty. Having regular bowel movements. Ambulating.     Grandmother  recently and plans to drive 3h north tomorrow for her .     Reviewed PMH, PSH, Meds and allergies.    Objective:  Vitals:    22 1540   BP: 122/79   Pulse: 74   Temp: 98.3  F (36.8  C)     There is no height or weight on file to calculate BMI.    General: alert, oriented, no distress  Exam deferred    Assessment:  2 weeks post op status post total laparoscopic hysterectomy complicated by bleeding on the cuff, treated  with monsels. No further bleeding. Otherwise doing well    Plan:   1. Status post laparoscopic hysterectomy    2. Vagina bleeding  Discussed pros and cons of exam today. I prefer no exam bc I do not want to disturb monsels and potentially cause bleeding. Patient counseled on monitoring bleeding. Discussed may need repeat monsels +/- additional stitch if persistent bleeding. Discussed Hgb 14.4 and normal CT are very reassuring. Follow up in 4 weeks, sooner PRN.    Jackie Christiansen, " MD

## 2022-12-17 NOTE — TELEPHONE ENCOUNTER
Pt calling with concerns about;    Post op bleeding - total hysterectomy on 11/30/22  Pt was seen in ED on 12/14 for vaginal bleeding  Began with heavy bleeding again today at or about 6 PM    2 large pads   Quarter size clots  Feels low pelvic cramps        Page to [facility] Provider, Dr. Angela Blood who advised;    Pt can monitor this at home for now using the 2 large pads/hour for 2 hours rule.  Dr. Blood also made appt for Pt to See Dr. Agustin on Monday 2 PM at [facility] Pt can still travel on 12/17 to Grandmothers service but note where an ED is located just in case.  If either bleeding or cramping unbearable Pt should go to ED.    Pt is notified of Dr. Ross recommendations and verbalized understanding and agrees with this plan of care.    Alva Sunshine RN, Nurse Advisor 9:38 PM 12/16/2022

## 2022-12-17 NOTE — TELEPHONE ENCOUNTER
I called patient to see how she is feeling today.  Gushed blood last night and called RN line.   Went to bed last night at 1900, no bleeding overnight.   Just a little spotting this morning, but it is bright red.     Ulises Butler is a 47 year old  who is 2 weeks post op status post total laparoscopic hysterectomy, post op course complicated by vaginal cuff bleeding. Treated with monsels in ED on . Seen in clinic yesterday and had no bleeding for 2 days, then had heavier bleeding last night. Bleeding is light this morning. Patient is driving north near [place] today for her grandmother's . Patient expresses understanding of when to go to a local hospital if severe bleeding occurs. She has follow up appointment on Monday in our clinic.     Jackie Christiansen MD

## 2022-12-19 ENCOUNTER — OFFICE VISIT (OUTPATIENT)
Dept: OBGYN | Facility: CLINIC | Age: 47
End: 2022-12-19
Payer: COMMERCIAL

## 2022-12-19 VITALS — HEART RATE: 70 BPM | OXYGEN SATURATION: 100 % | DIASTOLIC BLOOD PRESSURE: 72 MMHG | SYSTOLIC BLOOD PRESSURE: 123 MMHG

## 2022-12-19 DIAGNOSIS — N99.820 POSTOPERATIVE VAGINAL BLEEDING FOLLOWING GENITOURINARY PROCEDURE: Primary | ICD-10-CM

## 2022-12-19 PROCEDURE — 99024 POSTOP FOLLOW-UP VISIT: CPT | Performed by: OBSTETRICS & GYNECOLOGY

## 2022-12-20 NOTE — PROGRESS NOTES
GYN Progress Note     CC: Vaginal bleeding s/p [facility]     HPI: Ulises Butler is a 47 year old  who presents 2 weeks status post total laparoscopic hysterectomy with bilateral salpingectomy on  for abnormal uterine bleeding secondary to adenomyosis.      Since her surgery patient reports spotting but then on  she had an episode of heavier bleeding that saturated her underwear and pad. She went to the ED. CT scan was normal. hgb was 14.4 and on exam the cuff was intact with a small amount of oozing from a friable appearing edge and this was cauterized with Monsel's solution. She then reported minimal bleeding for the next 48 hours and was seen in the office for a routine post-op check on  but then called later that afternoon with several gushes of blood (enough to saturate an overnight pad) and passing quarter sized clots. She monitored bleeding at home and has continued to have lighter spotting which is now more pink than bright red but not the large gushes or passing large clots. She reports that overall her pain is controlled and she denies fevers, nausea or vomiting and is having regular bowel movements.     O: /72   Pulse 70   LMP  (LMP Unknown)   SpO2 100%    General: Patient alert and oriented, no acute distress  CV: no peripheral edema or cyanosis  Resp: normal respiratory effort and equal lung expansion  Abdomen: non-tender to light and deep palpation, area of previously noted tenderness in the RLQ during her ED visit is now much improved   : normal external genitalia without lesions. Vaginal cuff intact with area of friable tissue on the vaginal cuff along the left boarder that is oozing slightly after manipulation with the speculum exam. Cuff intact to gentle palpation and moderately tender. Silver nitrate applied to oozing area and Monsel's applied to entire vaginal cuff.   Ext: non-tender, no edema    Assessment and plan:   Ulises Butler is a 46 YO  who  presents 2 weeks s/p [facility] with persistent vaginal bleeding from the vaginal cuff   -We discussed that based on her exam in the ED and her exam in the office that the vaginal cuff is intact and there are no signs of infection and hematoma. We discussed that there are a few small areas where the cut edge of the vaginal mucosa is slightly everted instead of imbricated which tends to be more friable and can lead to granulation tissue but that we would still expect this to heal well in the long run but she may have more issues with spotting and bleeding until fully healed and we may need to apply silver nitrate again to treat granulation tissue as the cuff heals.   -At this time I don't think that return to OR for an EUA or further suturing of the cuff is neccessary nor is admission for observation. We discussed that if she has another episode of slightly heavier bleeding that she can monitor at home unless she is soaking a pad per hour for two hours in a row or having other post op concerns (fever, worsening pain, increased watery vaginal discharge, nausea/vomiting, etc).   -Will forward to Dr. Christiansen with the plan to follow up later this week with a phone call and if bleeding remains minimal then return for her routine 6 week post-op visit.     Sima Agustin MD

## 2022-12-21 ENCOUNTER — TELEPHONE (OUTPATIENT)
Dept: OBGYN | Facility: CLINIC | Age: 47
End: 2022-12-21

## 2022-12-21 NOTE — TELEPHONE ENCOUNTER
Ulises Butler is a 47 year old  who underwent total laparoscopic hysterectomy on 22. She has been seen twice for bleeding from vaginal cuff, treated twice with monsels. Doing much better. No more bleeding or spotting. Questions answered. Discussed if light to mod bleeding, okay to monitor at home but always good to call us if she is concerned.     Jackie Christiansen MD

## 2023-01-09 ENCOUNTER — TRANSFERRED RECORDS (OUTPATIENT)
Dept: MULTI SPECIALTY CLINIC | Facility: CLINIC | Age: 48
End: 2023-01-09

## 2023-01-09 LAB
ALT SERPL-CCNC: 21 IU/L (ref 5–35)
AST SERPL-CCNC: 22 U/L (ref 5–34)
CHOLESTEROL (EXTERNAL): 199 MG/DL (ref 4–200)
CREATININE (EXTERNAL): 0.69 MG/DL (ref 0.5–1.3)
GFR ESTIMATED (EXTERNAL): 97.4 ML/MIN/1.73M2
HDLC SERPL-MCNC: 71 MG/DL (ref 30–85)
LDL CHOLESTEROL CALCULATED (EXTERNAL): 119 MG/DL (ref 4–200)
TRIGLYCERIDES (EXTERNAL): 53 MG/DL (ref 7–150)

## 2023-01-13 ENCOUNTER — OFFICE VISIT (OUTPATIENT)
Dept: OBGYN | Facility: CLINIC | Age: 48
End: 2023-01-13
Payer: COMMERCIAL

## 2023-01-13 VITALS
OXYGEN SATURATION: 100 % | WEIGHT: 143 LBS | BODY MASS INDEX: 25.33 KG/M2 | HEART RATE: 79 BPM | SYSTOLIC BLOOD PRESSURE: 123 MMHG | DIASTOLIC BLOOD PRESSURE: 74 MMHG

## 2023-01-13 DIAGNOSIS — Z90.710 STATUS POST LAPAROSCOPIC HYSTERECTOMY: ICD-10-CM

## 2023-01-13 DIAGNOSIS — N89.8 GRANULATION TISSUE OF VAGINAL CUFF: Primary | ICD-10-CM

## 2023-01-13 PROCEDURE — 99024 POSTOP FOLLOW-UP VISIT: CPT | Performed by: OBSTETRICS & GYNECOLOGY

## 2023-01-13 NOTE — PROGRESS NOTES
GYN Clinic Visit  2023  CC:  Post-operative visit    HPI:  Ulises Butler is a 47 year old  who presents 6 weeks status post total laparoscopic hysterectomy bilateral salpingectomy on  for abnormal uterine bleeding secondary to adenomyosis and leiomyomas.  Post op course complicated by vaginal cuff bleeding - needed treatment with monsels x 2.   No bleeding since second treatment  Slowly increasing activity  Walking, biking, light weights  Back to work for 2 weeks  Wants to do yoga sculpt tomorrow and have sex  Bowels are not quite right - bloated and irregular bowel movements. Docusate senna and simethicone help. Eating lots of vegetables and drinks 1 gallon of water a day. Drinks kombucha and eats yogurt. Some days feels really bloated, other days are fine.  Voiding no problems.     Reviewed PMH, PSH, Meds and allergies.    Objective:  Vitals:    23 1200   BP: 123/74   Pulse: 79   SpO2: 100%   Weight: 64.9 kg (143 lb)     Body mass index is 25.33 kg/m .    General: alert, oriented, no distress  Abd: soft, nontender, nondistended, no masses or organomegaly. well healed laparoscopic incisions. A little irritation on RLQ incision  :   - vulva: normal external genitalia, normal hair pattern, no lesions, normal Bartholin's, normal [place]'s. urethera appears normal and is well supported.   - vagina: normal vaginal mucosa with rugae, no irritation, no discharge present  - vaginal cuff: 1cm area of granulation tissue in the midline  - bimanual exam: intact cuff, no masses, no tenderness.  Rectal: deferred    PROCEDURE:  Silver nitrate used to cauterize granulation tissue on vaginal cuff. 2 sticks used.  Patient tolerated procedure well. No bleeding.    Assessment:  Satisfactory post-operative course.  Granulation tissue cauterized with silver nitrate    Plan:   1. Granulation tissue of vaginal cuff  - CHEM CAUTERY GRANULATION TISSUE    2. Status post laparoscopic hysterectomy  May resume  exercise now  Wait 1 more week before inserting anything in vagina  Call if further bleeding, may need more silver nitrate    Follow up PRN only      Jackie Christiansen MD

## 2023-06-01 ENCOUNTER — HEALTH MAINTENANCE LETTER (OUTPATIENT)
Age: 48
End: 2023-06-01

## 2023-06-18 ENCOUNTER — HEALTH MAINTENANCE LETTER (OUTPATIENT)
Age: 48
End: 2023-06-18

## 2023-08-21 ENCOUNTER — LAB (OUTPATIENT)
Dept: LAB | Facility: CLINIC | Age: 48
End: 2023-08-21
Payer: COMMERCIAL

## 2023-08-21 DIAGNOSIS — M05.79 RHEUMATOID ARTHRITIS WITH RHEUMATOID FACTOR OF MULTIPLE SITES WITHOUT ORGAN OR SYSTEMS INVOLVEMENT (H): Primary | ICD-10-CM

## 2023-08-21 DIAGNOSIS — Z79.899 ENCOUNTER FOR LONG-TERM (CURRENT) USE OF HIGH-RISK MEDICATION: ICD-10-CM

## 2023-08-21 LAB
ALBUMIN SERPL BCG-MCNC: 4.5 G/DL (ref 3.5–5.2)
ALT SERPL W P-5'-P-CCNC: 15 U/L (ref 0–50)
AST SERPL W P-5'-P-CCNC: 23 U/L (ref 0–45)
BASOPHILS # BLD AUTO: 0 10E3/UL (ref 0–0.2)
BASOPHILS NFR BLD AUTO: 1 %
CHOLEST SERPL-MCNC: 167 MG/DL
CREAT SERPL-MCNC: 0.7 MG/DL (ref 0.51–0.95)
CRP SERPL-MCNC: <3 MG/L
EOSINOPHIL # BLD AUTO: 0.2 10E3/UL (ref 0–0.7)
EOSINOPHIL NFR BLD AUTO: 5 %
ERYTHROCYTE [DISTWIDTH] IN BLOOD BY AUTOMATED COUNT: 11.5 % (ref 10–15)
ERYTHROCYTE [SEDIMENTATION RATE] IN BLOOD BY WESTERGREN METHOD: 3 MM/HR (ref 0–20)
GFR SERPL CREATININE-BSD FRML MDRD: >90 ML/MIN/1.73M2
HCT VFR BLD AUTO: 40.9 % (ref 35–47)
HDLC SERPL-MCNC: 65 MG/DL
HGB BLD-MCNC: 13.7 G/DL (ref 11.7–15.7)
IMM GRANULOCYTES # BLD: 0 10E3/UL
IMM GRANULOCYTES NFR BLD: 0 %
LDLC SERPL CALC-MCNC: 96 MG/DL
LYMPHOCYTES # BLD AUTO: 1.7 10E3/UL (ref 0.8–5.3)
LYMPHOCYTES NFR BLD AUTO: 40 %
MCH RBC QN AUTO: 31.6 PG (ref 26.5–33)
MCHC RBC AUTO-ENTMCNC: 33.5 G/DL (ref 31.5–36.5)
MCV RBC AUTO: 94 FL (ref 78–100)
MONOCYTES # BLD AUTO: 0.4 10E3/UL (ref 0–1.3)
MONOCYTES NFR BLD AUTO: 10 %
NEUTROPHILS # BLD AUTO: 1.8 10E3/UL (ref 1.6–8.3)
NEUTROPHILS NFR BLD AUTO: 44 %
NONHDLC SERPL-MCNC: 102 MG/DL
PLATELET # BLD AUTO: 239 10E3/UL (ref 150–450)
RBC # BLD AUTO: 4.34 10E6/UL (ref 3.8–5.2)
TRIGL SERPL-MCNC: 29 MG/DL
WBC # BLD AUTO: 4.1 10E3/UL (ref 4–11)

## 2023-08-21 PROCEDURE — 85025 COMPLETE CBC W/AUTO DIFF WBC: CPT

## 2023-08-21 PROCEDURE — 86140 C-REACTIVE PROTEIN: CPT

## 2023-08-21 PROCEDURE — 82040 ASSAY OF SERUM ALBUMIN: CPT

## 2023-08-21 PROCEDURE — 82565 ASSAY OF CREATININE: CPT

## 2023-08-21 PROCEDURE — 85652 RBC SED RATE AUTOMATED: CPT

## 2023-08-21 PROCEDURE — 80061 LIPID PANEL: CPT

## 2023-08-21 PROCEDURE — 36415 COLL VENOUS BLD VENIPUNCTURE: CPT

## 2023-08-21 PROCEDURE — 84460 ALANINE AMINO (ALT) (SGPT): CPT

## 2023-08-21 PROCEDURE — 84450 TRANSFERASE (AST) (SGOT): CPT

## 2023-10-30 ENCOUNTER — TRANSFERRED RECORDS (OUTPATIENT)
Dept: HEALTH INFORMATION MANAGEMENT | Facility: CLINIC | Age: 48
End: 2023-10-30
Payer: COMMERCIAL

## 2023-12-01 ENCOUNTER — IMMUNIZATION (OUTPATIENT)
Dept: FAMILY MEDICINE | Facility: CLINIC | Age: 48
End: 2023-12-01
Payer: COMMERCIAL

## 2023-12-01 PROCEDURE — 90480 ADMN SARSCOV2 VAC 1/ONLY CMP: CPT

## 2023-12-01 PROCEDURE — 90471 IMMUNIZATION ADMIN: CPT

## 2023-12-01 PROCEDURE — 91320 SARSCV2 VAC 30MCG TRS-SUC IM: CPT

## 2023-12-01 PROCEDURE — 90686 IIV4 VACC NO PRSV 0.5 ML IM: CPT

## 2024-01-05 ENCOUNTER — OFFICE VISIT (OUTPATIENT)
Dept: OBGYN | Facility: CLINIC | Age: 49
End: 2024-01-05
Payer: COMMERCIAL

## 2024-01-05 VITALS
HEIGHT: 64 IN | BODY MASS INDEX: 23.9 KG/M2 | SYSTOLIC BLOOD PRESSURE: 125 MMHG | WEIGHT: 140 LBS | OXYGEN SATURATION: 99 % | HEART RATE: 68 BPM | DIASTOLIC BLOOD PRESSURE: 80 MMHG

## 2024-01-05 DIAGNOSIS — Z12.11 COLON CANCER SCREENING: ICD-10-CM

## 2024-01-05 DIAGNOSIS — Z11.59 ENCOUNTER FOR HEPATITIS C SCREENING TEST FOR LOW RISK PATIENT: ICD-10-CM

## 2024-01-05 DIAGNOSIS — R10.2 PELVIC PAIN IN FEMALE: ICD-10-CM

## 2024-01-05 DIAGNOSIS — Z01.419 WELL WOMAN EXAM WITH ROUTINE GYNECOLOGICAL EXAM: Primary | ICD-10-CM

## 2024-01-05 DIAGNOSIS — R30.0 DYSURIA: ICD-10-CM

## 2024-01-05 DIAGNOSIS — Z12.31 ENCOUNTER FOR SCREENING MAMMOGRAM FOR BREAST CANCER: ICD-10-CM

## 2024-01-05 LAB
ALBUMIN UR-MCNC: NEGATIVE MG/DL
APPEARANCE UR: CLEAR
BACTERIA #/AREA URNS HPF: ABNORMAL /HPF
BILIRUB UR QL STRIP: NEGATIVE
CLUE CELLS: ABNORMAL
COLOR UR AUTO: YELLOW
GLUCOSE UR STRIP-MCNC: NEGATIVE MG/DL
HGB UR QL STRIP: NEGATIVE
KETONES UR STRIP-MCNC: NEGATIVE MG/DL
LEUKOCYTE ESTERASE UR QL STRIP: NEGATIVE
NITRATE UR QL: NEGATIVE
PH UR STRIP: 6.5 [PH] (ref 5–7)
RBC #/AREA URNS AUTO: ABNORMAL /HPF
SP GR UR STRIP: 1.01 (ref 1–1.03)
SQUAMOUS #/AREA URNS AUTO: ABNORMAL /LPF
TRICHOMONAS, WET PREP: ABNORMAL
UROBILINOGEN UR STRIP-ACNC: 0.2 E.U./DL
WBC #/AREA URNS AUTO: ABNORMAL /HPF
WBC'S/HIGH POWER FIELD, WET PREP: ABNORMAL
YEAST, WET PREP: ABNORMAL

## 2024-01-05 PROCEDURE — 87210 SMEAR WET MOUNT SALINE/INK: CPT | Performed by: OBSTETRICS & GYNECOLOGY

## 2024-01-05 PROCEDURE — 99396 PREV VISIT EST AGE 40-64: CPT | Performed by: OBSTETRICS & GYNECOLOGY

## 2024-01-05 PROCEDURE — 81001 URINALYSIS AUTO W/SCOPE: CPT | Performed by: OBSTETRICS & GYNECOLOGY

## 2024-01-05 PROCEDURE — 87591 N.GONORRHOEAE DNA AMP PROB: CPT | Performed by: OBSTETRICS & GYNECOLOGY

## 2024-01-05 PROCEDURE — 99214 OFFICE O/P EST MOD 30 MIN: CPT | Mod: 25 | Performed by: OBSTETRICS & GYNECOLOGY

## 2024-01-05 PROCEDURE — 87491 CHLMYD TRACH DNA AMP PROBE: CPT | Performed by: OBSTETRICS & GYNECOLOGY
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
## 2024-01-06 LAB
C TRACH DNA SPEC QL PROBE+SIG AMP: NEGATIVE
N GONORRHOEA DNA SPEC QL NAA+PROBE: NEGATIVE

## 2024-01-08 DIAGNOSIS — R30.0 DYSURIA: Primary | ICD-10-CM

## 2024-01-11 ENCOUNTER — LAB (OUTPATIENT)
Dept: LAB | Facility: CLINIC | Age: 49
End: 2024-01-11
Payer: COMMERCIAL

## 2024-01-11 DIAGNOSIS — Z11.59 ENCOUNTER FOR HEPATITIS C SCREENING TEST FOR LOW RISK PATIENT: ICD-10-CM

## 2024-01-11 DIAGNOSIS — R30.0 DYSURIA: ICD-10-CM

## 2024-01-11 LAB
ALBUMIN UR-MCNC: NEGATIVE MG/DL
APPEARANCE UR: CLEAR
BACTERIA #/AREA URNS HPF: ABNORMAL /HPF
BILIRUB UR QL STRIP: NEGATIVE
COLOR UR AUTO: YELLOW
GLUCOSE UR STRIP-MCNC: NEGATIVE MG/DL
HGB UR QL STRIP: NEGATIVE
KETONES UR STRIP-MCNC: NEGATIVE MG/DL
LEUKOCYTE ESTERASE UR QL STRIP: NEGATIVE
NITRATE UR QL: NEGATIVE
PH UR STRIP: 6 [PH] (ref 5–7)
RBC #/AREA URNS AUTO: ABNORMAL /HPF
SP GR UR STRIP: 1.01 (ref 1–1.03)
UROBILINOGEN UR STRIP-ACNC: 0.2 E.U./DL
WBC #/AREA URNS AUTO: ABNORMAL /HPF

## 2024-01-11 PROCEDURE — 81001 URINALYSIS AUTO W/SCOPE: CPT

## 2024-01-11 PROCEDURE — 36415 COLL VENOUS BLD VENIPUNCTURE: CPT

## 2024-01-11 PROCEDURE — 86803 HEPATITIS C AB TEST: CPT

## 2024-01-12 ENCOUNTER — HOSPITAL ENCOUNTER (OUTPATIENT)
Dept: ULTRASOUND IMAGING | Facility: HOSPITAL | Age: 49
Discharge: HOME | End: 2024-01-12
Attending: OBSTETRICS & GYNECOLOGY
Payer: COMMERCIAL

## 2024-01-12 ENCOUNTER — ANCILLARY PROCEDURE (OUTPATIENT)
Dept: MAMMOGRAPHY | Facility: HOSPITAL | Age: 49
End: 2024-01-12
Attending: OBSTETRICS & GYNECOLOGY
Payer: COMMERCIAL

## 2024-01-12 DIAGNOSIS — Z12.31 ENCOUNTER FOR SCREENING MAMMOGRAM FOR BREAST CANCER: ICD-10-CM

## 2024-01-12 DIAGNOSIS — R10.2 PELVIC PAIN IN FEMALE: ICD-10-CM

## 2024-01-12 LAB — HCV AB SERPL QL IA: NONREACTIVE

## 2024-01-12 PROCEDURE — 76856 US EXAM PELVIC COMPLETE: CPT

## 2024-01-12 PROCEDURE — 76830 TRANSVAGINAL US NON-OB: CPT

## 2024-01-12 PROCEDURE — 77063 BREAST TOMOSYNTHESIS BI: CPT

## 2024-02-12 ENCOUNTER — HOSPITAL ENCOUNTER (OUTPATIENT)
Facility: AMBULATORY SURGERY CENTER | Age: 49
End: 2024-02-12
Attending: SURGERY
Payer: COMMERCIAL

## 2024-02-12 ENCOUNTER — TELEPHONE (OUTPATIENT)
Dept: GASTROENTEROLOGY | Facility: CLINIC | Age: 49
End: 2024-02-12
Payer: COMMERCIAL

## 2024-02-12 NOTE — TELEPHONE ENCOUNTER
"Endoscopy Scheduling Screen    Have you had a positive Covid test in the last 14 days?  No    Are you active on MyChart?   Yes    What insurance is in the chart?  Other:  BCBS    Ordering/Referring Provider: Mo   (If ordering provider performs procedure, schedule with ordering provider unless otherwise instructed. )    BMI: Estimated body mass index is 24.03 kg/m  as calculated from the following:    Height as of 1/5/24: 1.626 m (5' 4\").    Weight as of 1/5/24: 63.5 kg (140 lb).     Sedation Ordered  moderate sedation.   If patient BMI > 50 do not schedule in ASC.    If patient BMI > 45 do not schedule at [facility].    Are you taking methadone or Suboxone?  No    Have you had difficulties, pain, or discomfort during past endoscopy procedures?  No    Are you taking any prescription medications for pain 3 or more times per week?   NO - No RN review required.    Do you have a history of malignant hyperthermia or adverse reaction to anesthesia?  No    (Females) Are you currently pregnant?   No     Have you been diagnosed or told you have pulmonary hypertension?   No    Do you have an LVAD?  No    Have you been told you have moderate to severe sleep apnea?  No    Have you been told you have COPD, asthma, or any other lung disease?  No    Do you have any heart conditions?  No     Have you ever had an organ transplant?   No    Have you ever had or are you awaiting a heart or lung transplant?   No    Have you had a stroke or transient ischemic attack (TIA aka \"mini stroke\" in the last 6 months?   No    Have you been diagnosed with or been told you have cirrhosis of the liver?   No    Are you currently on dialysis?   No    Do you need assistance transferring?   No    BMI: Estimated body mass index is 24.03 kg/m  as calculated from the following:    Height as of 1/5/24: 1.626 m (5' 4\").    Weight as of 1/5/24: 63.5 kg (140 lb).     Is patients BMI > 40 and scheduling location [facility]?  No    Do you take an injectable medication " for weight loss or diabetes (excluding insulin)?  No    Do you take the medication Naltrexone?  No    Do you take blood thinners?  No       Prep   Are you currently on dialysis or do you have chronic kidney disease?  No    Do you have a diagnosis of diabetes?  No    Do you have a diagnosis of cystic fibrosis (CF)?  No    On a regular basis do you go 3 -5 days between bowel movements?  No    BMI > 40?  No    Preferred Pharmacy:    [place], [place] - [address]  [address]  [facility] [address]  [facility] [address]  Phone: [phone] Fax: [phone] Alternate Fax: [phone], [phone], [phone]      Final Scheduling Details   Colonoscopy prep sent?  N/A    Procedure scheduled  Colonoscopy    Surgeon:  Feliciano     Date of procedure:  7/29/24     Pre-OP / PAC:   No - Not required for this site.    Location  [facility] - Patient preference.    Sedation   MAC/Deep Sedation  Location      Patient Reminders:   You will receive a call from a Nurse to review instructions and health history.  This assessment must be completed prior to your procedure.  Failure to complete the Nurse assessment may result in the procedure being cancelled.      On the day of your procedure, please designate an adult(s) who can drive you home stay with you for the next 24 hours. The medicines used in the exam will make you sleepy. You will not be able to drive.      You cannot take public transportation, ride share services, or non-medical taxi service without a responsible caregiver.  Medical transport services are allowed with the requirement that a responsible caregiver will receive you at your destination.  We require that drivers and caregivers are confirmed prior to your procedure.

## 2024-07-25 ENCOUNTER — TELEPHONE (OUTPATIENT)
Dept: GASTROENTEROLOGY | Facility: CLINIC | Age: 49
End: 2024-07-25
Payer: COMMERCIAL

## 2024-07-25 ENCOUNTER — HOSPITAL ENCOUNTER (OUTPATIENT)
Facility: CLINIC | Age: 49
End: 2024-07-25
Attending: INTERNAL MEDICINE | Admitting: INTERNAL MEDICINE
Payer: COMMERCIAL

## 2024-07-25 NOTE — TELEPHONE ENCOUNTER
"Patient reached out to reschedule colonoscopy that was cancelled from 7/29.    Endoscopy Scheduling Screen    Have you had a positive Covid test in the last 14 days?  No    What is your communication preference for Instructions and/or Bowel Prep?   MyChart    What insurance is in the chart?  Other:  BCBS    Ordering/Referring Provider: Yanci Hassan MD in [facility]     (If ordering provider performs procedure, schedule with ordering provider unless otherwise instructed. )    BMI: Estimated body mass index is 24.03 kg/m  as calculated from the following:    Height as of 1/5/24: 1.626 m (5' 4\").    Weight as of 1/5/24: 63.5 kg (140 lb).     Sedation Ordered  moderate sedation.   If patient BMI > 50 do not schedule in ASC.    If patient BMI > 45 do not schedule at [facility].    Are you taking methadone or Suboxone?  No    Have you had difficulties, pain, or discomfort during past endoscopy procedures?  No    Are you taking any prescription medications for pain 3 or more times per week?   NO, No RN review required.    Do you have a history of malignant hyperthermia?  No    (Females) Are you currently pregnant?   No     Have you been diagnosed or told you have pulmonary hypertension?   No    Do you have an LVAD?  No    Have you been told you have moderate to severe sleep apnea?  No    Have you been told you have COPD, asthma, or any other lung disease?  No    Do you have any heart conditions?  No     Have you ever had or are you waiting for an organ transplant?  No. Continue scheduling, no site restrictions.    Have you had a stroke or transient ischemic attack (TIA aka \"mini stroke\" in the last 6 months?   No    Have you been diagnosed with or been told you have cirrhosis of the liver?   No    Are you currently on dialysis?   No    Do you need assistance transferring?   No    BMI: Estimated body mass index is 24.03 kg/m  as calculated from the following:    Height as of 1/5/24: 1.626 m (5' 4\").    Weight as of 1/5/24: " 63.5 kg (140 lb).     Is patients BMI > 40 and scheduling location [facility]?  No    Do you take an injectable medication for weight loss or diabetes (excluding insulin)?  No    Do you take the medication Naltrexone?  No    Do you take blood thinners?  No       Prep   Are you currently on dialysis or do you have chronic kidney disease?  No    Do you have a diagnosis of diabetes?  No    Do you have a diagnosis of cystic fibrosis (CF)?  No    On a regular basis do you go 3 -5 days between bowel movements?  No    BMI > 40?  No    Preferred Pharmacy:      [place], [place] - [address]  [address]  [address]  [facility] [address]  Phone: [phone] Fax: [phone] Alternate Fax: [phone], [phone], [phone]      Final Scheduling Details     Procedure scheduled  Colonoscopy    Surgeon:  TRAY     Date of procedure:  9/5     Pre-OP / PAC:   No - Not required for this site.    Location  [facility] - Patient preference.    Sedation   MAC/Deep Sedation  PER LOCATION      Patient Reminders:   You will receive a call from a Nurse to review instructions and health history.  This assessment must be completed prior to your procedure.  Failure to complete the Nurse assessment may result in the procedure being cancelled.      On the day of your procedure, please designate an adult(s) who can drive you home stay with you for the next 24 hours. The medicines used in the exam will make you sleepy. You will not be able to drive.      You cannot take public transportation, ride share services, or non-medical taxi service without a responsible caregiver.  Medical transport services are allowed with the requirement that a responsible caregiver will receive you at your destination.  We require that drivers and caregivers are confirmed prior to your procedure.

## 2024-09-03 ENCOUNTER — TELEPHONE (OUTPATIENT)
Dept: GASTROENTEROLOGY | Facility: CLINIC | Age: 49
End: 2024-09-03
Payer: COMMERCIAL

## 2024-09-03 NOTE — TELEPHONE ENCOUNTER
Caller: Ulises    Reason for Reschedule/Cancellation   (please be detailed, any staff messages or encounters to note?): no ride available      Prior to reschedule please review:  Ordering Provider: Yanci Hassan   Sedation Determined: CS  Does patient have any ASC Exclusions, please identify?: no      Notes on Cancelled Procedure:  Procedure: Lower Endoscopy [Colonoscopy]   Date: 9/5  Location: [facility]; [address] [facility] [facility] [address], [place] [address]  Surgeon: Jerome      Rescheduled: Yes,   Procedure: Lower Endoscopy [Colonoscopy]    Date: 11/21   Location: [facility]; [address] [place] [address]    Surgeon: Hazel   Sedation Level Scheduled  CS ,  Reason for Sedation Level order   Instructions updated and sent: y     Does patient need PAC or Pre -Op Rescheduled? : n       Did you cancel or rescheduled an EUS procedure? No.

## 2024-09-05 ENCOUNTER — TRANSFERRED RECORDS (OUTPATIENT)
Dept: HEALTH INFORMATION MANAGEMENT | Facility: CLINIC | Age: 49
End: 2024-09-05
Payer: COMMERCIAL

## 2024-10-08 ENCOUNTER — VIRTUAL VISIT (OUTPATIENT)
Dept: FAMILY MEDICINE | Facility: CLINIC | Age: 49
End: 2024-10-08
Payer: COMMERCIAL

## 2024-10-08 DIAGNOSIS — F40.243 FEAR OF FLYING: Primary | ICD-10-CM

## 2024-10-08 PROCEDURE — 99213 OFFICE O/P EST LOW 20 MIN: CPT | Mod: 95 | Performed by: FAMILY MEDICINE

## 2024-10-08 RX ORDER — LORAZEPAM 1 MG/1
1 TABLET ORAL EVERY 6 HOURS PRN
Qty: 15 TABLET | Refills: 0 | Status: SHIPPED | OUTPATIENT
Start: 2024-10-08

## 2024-10-08 NOTE — PROGRESS NOTES
Ulises is a 49 year old who is being evaluated via a billable video visit.    How would you like to obtain your AVS? MyChart  If the video visit is dropped, the invitation should be resent by: Text to cell phone: [phone]  Will anyone else be joining your video visit? No      Assessment & Plan     Fear of flying  And insomnia   Used ativan  it before with no concerns  Due for annual exam   Will set that up  - Lorazepam (ATIVAN) 1 MG tablet; Take 1 tablet (1 mg) by mouth every 6 hours as needed for anxiety.            See Patient Instructions    Subjective   Ulises is a 49 year old, presenting for the following health issues:  No chief complaint on file.    History of Present Illness       Reason for visit:  Medication for  flight anxiety   She is taking medications regularly.     Anxierty with travel        Review of Systems  Constitutional, HEENT, cardiovascular, pulmonary, gi and gu systems are negative, except as otherwise noted.      Objective           Vitals:  No vitals were obtained today due to virtual visit.    Physical Exam   GENERAL: alert and no distress  EYES: Eyes grossly normal to inspection.  No discharge or erythema, or obvious scleral/conjunctival abnormalities.  RESP: No audible wheeze, cough, or visible cyanosis.    SKIN: Visible skin clear. No significant rash, abnormal pigmentation or lesions.  NEURO: Cranial nerves grossly intact.  Mentation and speech appropriate for age.  PSYCH: Appropriate affect, tone, and pace of words    Lab on 01/11/2024   Component Date Value Ref Range Status     Color Urine 01/11/2024 Yellow  Colorless, Straw, Light Yellow, Yellow Final     Appearance Urine 01/11/2024 Clear  Clear Final     Glucose Urine 01/11/2024 Negative  Negative mg/dL Final     Bilirubin Urine 01/11/2024 Negative  Negative Final     Ketones Urine 01/11/2024 Negative  Negative mg/dL Final     Specific Gravity Urine 01/11/2024 1.010  1.003 - 1.035 Final     Blood Urine 01/11/2024 Negative   Negative Final     pH Urine 01/11/2024 6.0  5.0 - 7.0 Final     Protein Albumin Urine 01/11/2024 Negative  Negative mg/dL Final     Urobilinogen Urine 01/11/2024 0.2  0.2, 1.0 E.U./dL Final     Nitrite Urine 01/11/2024 Negative  Negative Final     Leukocyte Esterase Urine 01/11/2024 Negative  Negative Final     Bacteria Urine 01/11/2024 Few (A)  None Seen /HPF Final     RBC Urine 01/11/2024 None Seen  0-2 /HPF /HPF Final     WBC Urine 01/11/2024 0-5  0-5 /HPF /HPF Final     Hepatitis C Antibody 01/11/2024 Nonreactive  Nonreactive Final    A nonreactive screening test result does not exclude the possibility of exposure to or infection with HCV. Nonreactive screening test results in individuals with prior exposure to HCV may be due to antibody levels below the limit of detection of this assay or lack of reactivity to the HCV antigens used in this assay. Patients with recent HCV infections (<3 months from time of exposure) may have false-negative HCV antibody results due to the time needed for seroconversion (average of 8 to 9 weeks).         Video-Visit Details    Type of service:  Video Visit   Originating Location (pt. Location): Home    Distant Location (provider location):  On-site  Platform used for Video Visit: Hung  Signed Electronically by: Sohail Simmons MD

## 2024-11-07 ENCOUNTER — TELEPHONE (OUTPATIENT)
Dept: GASTROENTEROLOGY | Facility: CLINIC | Age: 49
End: 2024-11-07
Payer: COMMERCIAL

## 2024-11-07 NOTE — TELEPHONE ENCOUNTER
Rescheduled Procedure  Due to:  no ride    Pre assessment completed for upcoming procedure.      Procedure details:    Patient scheduled for Colonoscopy on 11/21/24.     Arrival time: 0845. Procedure time 0930    Facility location: [facility]; [facility] [place] [address]. Check in location: 1st Floor [facility].     Sedation type: Conscious sedation     Pre op exam needed? No.    Indication for procedure: Screening    Designated  policy reviewed. Instructed to have someone stay 6 hours post procedure.       Chart review:     Electronic implanted devices? No    Recent diagnosis of diverticulitis within the last 6 weeks?  No      Medication review:    Diabetic? No    Anticoagulants? No    Weight loss medication/injectable? No.    Other medication HOLDING recommendations:  Ferrous sulfate (iron supplements): HOLD 7 days before procedure.  Fiber supplement HOLD 7 days before procedure  Patient verbalized taking both iron and fiber supplements.       Prep for procedure:     Bowel prep recommendation: Standard Miralax  Due to: standard bowel prep.    Prep instructions sent via [employer] by [facility] nursing team on 10/31/24.    Patient stated they have already reviewed the bowel prep instructions and printed them off.  NPO instructions reviewed.    Patient was instructed to call if any questions or concerns arise.     Patient verbalized understanding and had no questions or concerns at this time.        Lana Adhikari RN  Endoscopy Procedure Pre Assessment   [phone] option 2

## 2024-11-16 ENCOUNTER — IMMUNIZATION (OUTPATIENT)
Dept: FAMILY MEDICINE | Facility: CLINIC | Age: 49
End: 2024-11-16
Payer: COMMERCIAL

## 2024-11-18 ENCOUNTER — OFFICE VISIT (OUTPATIENT)
Dept: FAMILY MEDICINE | Facility: CLINIC | Age: 49
End: 2024-11-18
Payer: COMMERCIAL

## 2024-11-18 VITALS
BODY MASS INDEX: 27.46 KG/M2 | SYSTOLIC BLOOD PRESSURE: 120 MMHG | HEIGHT: 63 IN | HEART RATE: 61 BPM | WEIGHT: 155 LBS | RESPIRATION RATE: 16 BRPM | DIASTOLIC BLOOD PRESSURE: 62 MMHG | OXYGEN SATURATION: 100 % | TEMPERATURE: 98.3 F

## 2024-11-18 DIAGNOSIS — Z13.6 CARDIOVASCULAR SCREENING; LDL GOAL LESS THAN 160: ICD-10-CM

## 2024-11-18 DIAGNOSIS — F41.8 SITUATIONAL ANXIETY: ICD-10-CM

## 2024-11-18 DIAGNOSIS — Z00.00 ROUTINE HISTORY AND PHYSICAL EXAMINATION OF ADULT: Primary | ICD-10-CM

## 2024-11-18 DIAGNOSIS — M05.9 SEROPOSITIVE RHEUMATOID ARTHRITIS (H): ICD-10-CM

## 2024-11-18 PROCEDURE — 84443 ASSAY THYROID STIM HORMONE: CPT | Performed by: FAMILY MEDICINE

## 2024-11-18 PROCEDURE — 99396 PREV VISIT EST AGE 40-64: CPT | Performed by: FAMILY MEDICINE

## 2024-11-18 PROCEDURE — 80061 LIPID PANEL: CPT | Performed by: FAMILY MEDICINE

## 2024-11-18 PROCEDURE — 80053 COMPREHEN METABOLIC PANEL: CPT | Performed by: FAMILY MEDICINE

## 2024-11-18 PROCEDURE — 36415 COLL VENOUS BLD VENIPUNCTURE: CPT | Performed by: FAMILY MEDICINE

## 2024-11-18 SDOH — HEALTH STABILITY: PHYSICAL HEALTH: ON AVERAGE, HOW MANY DAYS PER WEEK DO YOU ENGAGE IN MODERATE TO STRENUOUS EXERCISE (LIKE A BRISK WALK)?: 5 DAYS

## 2024-11-18 SDOH — HEALTH STABILITY: PHYSICAL HEALTH: ON AVERAGE, HOW MANY MINUTES DO YOU ENGAGE IN EXERCISE AT THIS LEVEL?: 40 MIN

## 2024-11-18 ASSESSMENT — SOCIAL DETERMINANTS OF HEALTH (SDOH): HOW OFTEN DO YOU GET TOGETHER WITH FRIENDS OR RELATIVES?: ONCE A WEEK
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
## 2024-11-19 LAB
ALBUMIN SERPL BCG-MCNC: 4.6 G/DL (ref 3.5–5.2)
ALP SERPL-CCNC: 38 U/L (ref 40–150)
ALT SERPL W P-5'-P-CCNC: 19 U/L (ref 0–50)
ANION GAP SERPL CALCULATED.3IONS-SCNC: 12 MMOL/L (ref 7–15)
AST SERPL W P-5'-P-CCNC: 29 U/L (ref 0–45)
BILIRUB SERPL-MCNC: 0.6 MG/DL
BUN SERPL-MCNC: 13.2 MG/DL (ref 6–20)
CALCIUM SERPL-MCNC: 10.1 MG/DL (ref 8.8–10.4)
CHLORIDE SERPL-SCNC: 101 MMOL/L (ref 98–107)
CHOLEST SERPL-MCNC: 198 MG/DL
CREAT SERPL-MCNC: 0.65 MG/DL (ref 0.51–0.95)
EGFRCR SERPLBLD CKD-EPI 2021: >90 ML/MIN/1.73M2
FASTING STATUS PATIENT QL REPORTED: NO
FASTING STATUS PATIENT QL REPORTED: NO
GLUCOSE SERPL-MCNC: 110 MG/DL (ref 70–99)
HCO3 SERPL-SCNC: 26 MMOL/L (ref 22–29)
HDLC SERPL-MCNC: 78 MG/DL
LDLC SERPL CALC-MCNC: 87 MG/DL
NONHDLC SERPL-MCNC: 120 MG/DL
POTASSIUM SERPL-SCNC: 4.7 MMOL/L (ref 3.4–5.3)
PROT SERPL-MCNC: 6.9 G/DL (ref 6.4–8.3)
SODIUM SERPL-SCNC: 139 MMOL/L (ref 135–145)
TRIGL SERPL-MCNC: 166 MG/DL
TSH SERPL DL<=0.005 MIU/L-ACNC: 1.97 UIU/ML (ref 0.3–4.2)

## 2024-11-21 ENCOUNTER — HOSPITAL ENCOUNTER (OUTPATIENT)
Facility: CLINIC | Age: 49
Discharge: HOME | End: 2024-11-21
Attending: INTERNAL MEDICINE | Admitting: INTERNAL MEDICINE
Payer: COMMERCIAL

## 2024-11-21 VITALS
HEART RATE: 56 BPM | RESPIRATION RATE: 25 BRPM | OXYGEN SATURATION: 96 % | DIASTOLIC BLOOD PRESSURE: 75 MMHG | SYSTOLIC BLOOD PRESSURE: 116 MMHG

## 2024-11-21 LAB — COLONOSCOPY: NORMAL

## 2024-11-21 PROCEDURE — 250N000011 HC RX IP 250 OP 636: Performed by: INTERNAL MEDICINE

## 2024-11-21 PROCEDURE — G0121 COLON CA SCRN NOT HI RSK IND: HCPCS | Performed by: INTERNAL MEDICINE

## 2024-11-21 PROCEDURE — 45378 DIAGNOSTIC COLONOSCOPY: CPT | Performed by: INTERNAL MEDICINE

## 2024-11-21 PROCEDURE — G0500 MOD SEDAT ENDO SERVICE >5YRS: HCPCS | Performed by: INTERNAL MEDICINE

## 2024-11-21 RX ORDER — FENTANYL CITRATE 50 UG/ML
INJECTION, SOLUTION INTRAMUSCULAR; INTRAVENOUS PRN
Status: DISCONTINUED | OUTPATIENT
Start: 2024-11-21 | End: 2024-11-21 | Stop reason: HOSPADM

## 2024-11-21 ASSESSMENT — ACTIVITIES OF DAILY LIVING (ADL)
ADLS_ACUITY_SCORE: 0
ADLS_ACUITY_SCORE: 0

## 2024-12-09 ENCOUNTER — MYC MEDICAL ADVICE (OUTPATIENT)
Dept: FAMILY MEDICINE | Facility: CLINIC | Age: 49
End: 2024-12-09
Payer: COMMERCIAL

## 2025-02-25 ENCOUNTER — OFFICE VISIT (OUTPATIENT)
Dept: URGENT CARE | Facility: URGENT CARE | Age: 50
End: 2025-02-25
Payer: COMMERCIAL

## 2025-02-25 VITALS
TEMPERATURE: 97.6 F | DIASTOLIC BLOOD PRESSURE: 74 MMHG | BODY MASS INDEX: 24.24 KG/M2 | WEIGHT: 142 LBS | SYSTOLIC BLOOD PRESSURE: 110 MMHG | HEART RATE: 74 BPM | RESPIRATION RATE: 15 BRPM | HEIGHT: 64 IN | OXYGEN SATURATION: 96 %

## 2025-02-25 DIAGNOSIS — S91.214A: Primary | ICD-10-CM

## 2025-02-25 PROCEDURE — 3074F SYST BP LT 130 MM HG: CPT | Performed by: PHYSICIAN ASSISTANT

## 2025-02-25 PROCEDURE — 99213 OFFICE O/P EST LOW 20 MIN: CPT | Performed by: PHYSICIAN ASSISTANT

## 2025-02-25 PROCEDURE — 3078F DIAST BP <80 MM HG: CPT | Performed by: PHYSICIAN ASSISTANT

## 2025-02-25 RX ORDER — CEPHALEXIN 500 MG/1
500 CAPSULE ORAL 3 TIMES DAILY
Qty: 21 CAPSULE | Refills: 0 | Status: SHIPPED | OUTPATIENT
Start: 2025-02-25 | End: 2025-03-04

## 2025-02-25 NOTE — PATIENT INSTRUCTIONS
Skin glue will act as protection against infection    Wound should heal on its own    For additional support use pressure bandaging to hold edges together.   Replace wet bandaging immediately    Bandages will likely only be needed for another 2-3 days    Start antibiotics if evidence of local infection occurs

## 2025-02-25 NOTE — PROGRESS NOTES
"SUBJECTIVE:     Chief Complaint   Patient presents with    Urgent Care     Cut right second toe on right foot on Sunday, tripped over hand drill and was cut by the drill bit.tdap 2018     Ulises Butler is a 50 year old female who presents to the clinic with a laceration on the right toe sustained 2 day(s) ago.  This is a non-work related injury.    Mechanism of injury: metal drill to barefoot.    Associated symptoms: Denies numbness, weakness, or loss of function  Last tetanus booster within 10 years: yes    EXAM:   The patient appears today in alert,no apparent distress distress  VITALS: /74   Pulse 74   Temp 97.6  F (36.4  C) (Temporal)   Resp 15   Ht 1.626 m (5' 4\")   Wt 64.4 kg (142 lb)   LMP  (LMP Unknown)   SpO2 96%   BMI 24.37 kg/m      Size of laceration: 6 mm  Characteristics of the laceration: colosed with skin glue  Tendon function intact: yes  Sensation to light touch intact: yes      Assessment:  (S91.214A) Laceration of lesser toe of right foot with damage to nail, foreign body presence unspecified, initial encounter  (primary encounter diagnosis)  Plan: cephALEXin (KEFLEX) 500 MG capsule  Patient Instructions   Skin glue will act as protection against infection    Wound should heal on its own    For additional support use pressure bandaging to hold edges together.   Replace wet bandaging immediately    Bandages will likely only be needed for another 2-3 days    Start antibiotics if evidence of local infection occurs               "

## 2025-03-17 ENCOUNTER — TRANSFERRED RECORDS (OUTPATIENT)
Dept: HEALTH INFORMATION MANAGEMENT | Facility: CLINIC | Age: 50
End: 2025-03-17
Payer: COMMERCIAL

## 2025-03-24 ENCOUNTER — E-VISIT (OUTPATIENT)
Dept: FAMILY MEDICINE | Facility: CLINIC | Age: 50
End: 2025-03-24
Payer: COMMERCIAL

## 2025-03-24 DIAGNOSIS — J01.90 ACUTE SINUSITIS WITH SYMPTOMS > 10 DAYS: Primary | ICD-10-CM

## 2025-03-25 RX ORDER — AZITHROMYCIN 250 MG/1
TABLET, FILM COATED ORAL
Qty: 6 TABLET | Refills: 0 | Status: SHIPPED | OUTPATIENT
Start: 2025-03-25 | End: 2025-03-30

## 2025-03-25 NOTE — PATIENT INSTRUCTIONS

## 2025-03-25 NOTE — TELEPHONE ENCOUNTER
Encounter Diagnosis   Name Primary?    Acute sinusitis with symptoms > 10 days Yes      Orders Placed This Encounter    azithromycin (ZITHROMAX) 250 MG tablet     Sig: Take 2 tablets (500 mg) by mouth daily for 1 day, THEN 1 tablet (250 mg) daily for 4 days.     Dispense:  6 tablet     Refill:  0     Nasal irrigation  Provider E-Visit time total (minutes): 6 minutes

## 2025-05-06 ENCOUNTER — MYC REFILL (OUTPATIENT)
Dept: FAMILY MEDICINE | Facility: CLINIC | Age: 50
End: 2025-05-06
Payer: COMMERCIAL

## 2025-05-06 DIAGNOSIS — F40.243 FEAR OF FLYING: ICD-10-CM

## 2025-05-06 RX ORDER — LORAZEPAM 1 MG/1
1 TABLET ORAL EVERY 6 HOURS PRN
Qty: 15 TABLET | Refills: 0 | Status: SHIPPED | OUTPATIENT
Start: 2025-05-06

## (undated) DEVICE — SU VICRYL 0 CT-2 27" J334H

## (undated) DEVICE — SOL NACL 0.9% INJ 1000ML BAG 2B1324X

## (undated) DEVICE — TUBING SMOKE EVAC PNEUVIEW 9660-XE

## (undated) DEVICE — NDL COUNTER 40CT  31142311

## (undated) DEVICE — SPONGE RAY-TEC 4X8" 7318

## (undated) DEVICE — DECANTER TRANSFER DEVICE 2008S

## (undated) DEVICE — ESU HANDPIECE OLYMPUS PK SPATULA PK-SP0533

## (undated) DEVICE — CATH TRAY FOLEY SURESTEP 16FR WDRAIN BAG STLK LATEX A300316A

## (undated) DEVICE — ESU LIGASURE LAPAROSCOPIC BLUNT TIP SEALER 5MMX37CM LF1837

## (undated) DEVICE — SU MONOCRYL 4-0 PS-2 18" UND Y496G

## (undated) DEVICE — APPLICATORS COTTON TIP 6"X2 STERILE LF C15053-006

## (undated) DEVICE — SU VICRYL 0 CT-1 36" J346H

## (undated) DEVICE — ESU GROUND PAD UNIVERSAL W/O CORD

## (undated) DEVICE — PANTIES MESH LG/XLG 2PK 706M2

## (undated) DEVICE — Device

## (undated) DEVICE — TUBING C02 INSUFFLATION LAP FILTER HEATER 6198

## (undated) DEVICE — SUCTION IRR STRYKERFLOW II W/TIP 250-070-520

## (undated) DEVICE — GLOVE BIOGEL PI MICRO INDICATOR UNDERGLOVE SZ 7.0 48970

## (undated) DEVICE — PAD PERI INDIV WRAP 11" 2022A

## (undated) DEVICE — SUCTION MANIFOLD NEPTUNE 2 SYS 4 PORT 0702-020-000

## (undated) DEVICE — ENDO TROCAR SLEEVE KII ADV FIXATION 05X100MM CFS02

## (undated) DEVICE — GLOVE BIOGEL PI MICRO SZ 6.5 48565

## (undated) DEVICE — SOL NACL 0.9% IRRIG 1000ML BOTTLE 2F7124

## (undated) DEVICE — LINEN GOWN X4 5410

## (undated) DEVICE — STRAP KNEE/BODY 31143004

## (undated) DEVICE — TUBING SUCTION MEDI-VAC 1/4"X20' N620A

## (undated) DEVICE — SUCTION TIP YANKAUER W/O VENT K86

## (undated) DEVICE — JELLY LUBRICATING SURGILUBE 2OZ TUBE 281020502

## (undated) DEVICE — PREP DYNA-HEX 4% CHG SCRUB 4OZ BOTTLE MDS098710

## (undated) DEVICE — PREP SKIN SCRUB TRAY 4461A

## (undated) DEVICE — PEN MARKING SKIN W/LABELS 31145918

## (undated) DEVICE — WIPES FOLEY CARE SURESTEP PROVON DFC100

## (undated) DEVICE — ENDO TROCAR FIRST ENTRY KII FIOS ADV FIX 05X100MM CFF03

## (undated) DEVICE — PAD CHUX UNDERPAD 30X36" P3036C

## (undated) DEVICE — ESU HOLDER LAP INST DISP PURPLE LONG 330MM H-PRO-330

## (undated) DEVICE — RETR ELEV / UTERINE MANIPULATOR V-CARE MED CUP 60-6085-201A

## (undated) DEVICE — ADH SKIN CLOSURE PREMIERPRO EXOFIN 1.0ML 3470

## (undated) DEVICE — LINEN TOWEL PACK X5 5464

## (undated) DEVICE — ENDO SCOPE WARMER LF TM500

## (undated) RX ORDER — PHENAZOPYRIDINE HYDROCHLORIDE 200 MG/1
TABLET, FILM COATED ORAL
Status: DISPENSED
Start: 2022-11-30

## (undated) RX ORDER — FENTANYL CITRATE 50 UG/ML
INJECTION, SOLUTION INTRAMUSCULAR; INTRAVENOUS
Status: DISPENSED
Start: 2024-11-21

## (undated) RX ORDER — ONDANSETRON 2 MG/ML
INJECTION INTRAMUSCULAR; INTRAVENOUS
Status: DISPENSED
Start: 2022-11-30

## (undated) RX ORDER — LIDOCAINE HYDROCHLORIDE 20 MG/ML
JELLY TOPICAL
Status: DISPENSED
Start: 2022-11-30

## (undated) RX ORDER — SCOLOPAMINE TRANSDERMAL SYSTEM 1 MG/1
PATCH, EXTENDED RELEASE TRANSDERMAL
Status: DISPENSED
Start: 2022-11-30

## (undated) RX ORDER — ACETAMINOPHEN 325 MG/1
TABLET ORAL
Status: DISPENSED
Start: 2022-11-30

## (undated) RX ORDER — CEFAZOLIN SODIUM/WATER 2 G/20 ML
SYRINGE (ML) INTRAVENOUS
Status: DISPENSED
Start: 2022-11-30

## (undated) RX ORDER — OXYCODONE HYDROCHLORIDE 5 MG/1
TABLET ORAL
Status: DISPENSED
Start: 2022-11-30

## (undated) RX ORDER — HALOPERIDOL 5 MG/ML
INJECTION INTRAMUSCULAR
Status: DISPENSED
Start: 2022-11-30

## (undated) RX ORDER — FENTANYL CITRATE 50 UG/ML
INJECTION, SOLUTION INTRAMUSCULAR; INTRAVENOUS
Status: DISPENSED
Start: 2022-11-30

## (undated) RX ORDER — HYDROMORPHONE HCL IN WATER/PF 6 MG/30 ML
PATIENT CONTROLLED ANALGESIA SYRINGE INTRAVENOUS
Status: DISPENSED
Start: 2022-11-30